# Patient Record
Sex: FEMALE | Race: WHITE
[De-identification: names, ages, dates, MRNs, and addresses within clinical notes are randomized per-mention and may not be internally consistent; named-entity substitution may affect disease eponyms.]

---

## 2022-08-30 ENCOUNTER — HOSPITAL ENCOUNTER (OUTPATIENT)
Dept: HOSPITAL 49 - FAS | Age: 82
Discharge: HOME | End: 2022-08-30
Attending: ORTHOPAEDIC SURGERY
Payer: MEDICARE

## 2022-08-30 VITALS — BODY MASS INDEX: 19.16 KG/M2 | WEIGHT: 114.99 LBS | HEIGHT: 65 IN

## 2022-08-30 DIAGNOSIS — E78.5: ICD-10-CM

## 2022-08-30 DIAGNOSIS — M16.11: Primary | ICD-10-CM

## 2022-08-30 DIAGNOSIS — J45.909: ICD-10-CM

## 2022-08-30 DIAGNOSIS — Z79.899: ICD-10-CM

## 2022-08-30 DIAGNOSIS — Z79.891: ICD-10-CM

## 2022-08-30 DIAGNOSIS — I10: ICD-10-CM

## 2022-08-30 DIAGNOSIS — K21.9: ICD-10-CM

## 2022-10-11 ENCOUNTER — PREP FOR SURGERY (OUTPATIENT)
Dept: OTHER | Facility: HOSPITAL | Age: 82
End: 2022-10-11

## 2022-10-11 DIAGNOSIS — M16.11 PRIMARY OSTEOARTHRITIS OF RIGHT HIP: Primary | ICD-10-CM

## 2022-10-11 RX ORDER — ACETAMINOPHEN 500 MG
1000 TABLET ORAL ONCE
Status: CANCELLED | OUTPATIENT
Start: 2022-10-28 | End: 2022-10-11

## 2022-10-11 RX ORDER — PREGABALIN 75 MG/1
75 CAPSULE ORAL ONCE
Status: CANCELLED | OUTPATIENT
Start: 2022-10-28 | End: 2022-10-11

## 2022-10-11 RX ORDER — TRANEXAMIC ACID 10 MG/ML
1000 INJECTION, SOLUTION INTRAVENOUS ONCE
Status: CANCELLED | OUTPATIENT
Start: 2022-10-28 | End: 2022-10-11

## 2022-10-11 RX ORDER — CHLORHEXIDINE GLUCONATE 500 MG/1
CLOTH TOPICAL ONCE
Status: CANCELLED | OUTPATIENT
Start: 2022-10-28

## 2022-10-11 RX ORDER — CHLORHEXIDINE GLUCONATE 500 MG/1
CLOTH TOPICAL 2 TIMES DAILY
Status: CANCELLED | OUTPATIENT
Start: 2022-10-11

## 2022-10-11 RX ORDER — CEFAZOLIN SODIUM 2 G/100ML
2 INJECTION, SOLUTION INTRAVENOUS ONCE
Status: CANCELLED | OUTPATIENT
Start: 2022-10-28 | End: 2022-10-11

## 2022-10-11 NOTE — H&P
Chief Complaint  Right hip pain  Patient's Pharmacies  [id number] ([facility]): [address] [place] [address], Ph [phone], Fax [phone]  Vitals  10/11/2022 09:59 am  Ht: 5 ft 5 in  Wt: 110 lbs  BMI: 18.3  Body Surface Area: 1.51 m²  Allergies  Reviewed Allergies  NKDA  Medications  Reviewed Medications  dilTIAZem  mg capsule,extended release 24 hr  TAKE 1 CAPSULE BY MOUTH EVERY DAY  08/23/22   filled surescripts  levothyroxine 50 mcg tablet  PLEASE SEE ATTACHED FOR DETAILED DIRECTIONS  09/30/22   filled surescripts  lisinopriL 10 mg tablet  TAKE 1 TABLET BY MOUTH EVERY DAY  09/18/22   filled surescripts  methylPREDNISolone 4 mg tablets in a dose pack  TAKE 6 TABLETS ON DAY 1 AS DIRECTED ON PACKAGE AND DECREASE BY 1 TAB EACH DAY FOR A TOTAL OF 6 DAYS  06/21/22   filled surescripts  miSOPROStoL 200 mcg tablet  Take 1 tablet(s) 4 times a day by oral route.  10/11/22   entered Kiara Torres  montelukast 10 mg tablet  TAKE 1 TABLET BY MOUTH EVERY DAY  08/19/22   filled surescripts  rosuvastatin 5 mg tablet  TAKE 1 TABLET BY MOUTH EVERY DAY FOR 90 DAYS  09/06/22   filled surescripts  simvastatin 80 mg tablet  TAKE 1 TABLET BY MOUTH EVERY DAY IN THE EVENING  05/31/22   filled surescripts  Vitamin B-6 100 mg tablet  TAKE 1 TABLET BY MOUTH EVERY DAY FOR 90 DAYS  07/19/22   filled surescripts  Vaccines  Reviewed Vaccines  Vaccine Type Date Amt. Route Site NDC Lot # Mfr. Exp.  Date VIS VIS  Given Vaccinator  Influenza  influenza, injectable, quadrivalent 11/01/21            Problems  Reviewed Problems  Family History  Reviewed Family History  Social History  Reviewed Social History  Substance Use  Do you or have you ever smoked tobacco?: Never smoker  Do you or have you ever used any other forms of tobacco or nicotine?: No  Has tobacco cessation counseling been provided?: No  What is your level of alcohol consumption?: None  Do you use any illicit or recreational drugs?: No  Public Health  and Travel  Have you recently traveled abroad?: No  Have you been to an area known to be high risk for COVID-19?: No  In the 14 days before symptom onset, have you had close contact with a laboratory-confirmed COVID-19 while that case was ill?: No  In the 14 days before symptom onset, have you had close contact with a person who is under investigation for COVID-19 while that person was ill?: No  Advanced Directive  Do you have an advanced directive?: No  Do you have a medical power of ?: No  Surgical History  Reviewed Surgical History  Hysterectomy - 01/01/2012  Past Medical History  Reviewed Past Medical History  Gastrointestinal Disease/GERD: Y  High Cholesterol: Y  Hypertension: Y  Thyroid Disease: Y  HPI  Conchita is a 82-year-old female who comes into the office today for evaluation and management of her right hip pain.  She has been dealing with this pain for several years. She has mostly pain in the right groin area at times it radiates to the right thigh and knee.  She was recently seeing her primary care physician and was advised to see her orthopedic surgeon Dr. Irizarry. Dr. Irizarry has evaluated her and has managed her arthritis initially with an intra-articular cortisone injection. Subsequent follow-up showed that there was significant collapse of the right femoral head. She has been referred for a right total hip replacement.  Patient states that her pain has been worsening recently and she has difficulty ambulating any distances. She has been using a cane for the past 3 to 4 weeks. She is here in the office with a walker today.  She states that she has difficulty putting her shoes and socks on difficulty getting in and out of her car.  Denies any history of MRSA, DVT, cardiac problems.  She is accompanied by her daughter-in-law to the office visit.  ROS  ROS as noted in the HPI  Physical Exam  GAIT antalgic, with walker  Right hip  Normal: Neurovascular status is intact. Sensation in hip is  normal. DP Pulse is 3+. PT PULSE is 3+. Capillary Refill is normal. Reflexes are normal.  ROM  Internal Rotation: <30  External Rotation: <40  Abduction: <45  Adduction: <15  Flexion: <100  Extension: <5    Tenderness  Location: groin  Radiation of Pain: anterior thigh  Pain w/ Palpation: none  Katerina Test: negative  Impingement: negative  Straight Leg Raise: negative    Strength  Quad: normal  Abduction: normal  Adduction: normal  Flexion: normal  Extension: normal  Positive Stinchfield sign.  Positive Trendelenburg sign.  Attempted movements of the hip are painful and restricted  Assessment / Plan  1. Osteoarthritis of right hip joint  M16.11: Unilateral primary osteoarthritis, right hip  MEDICAL CLEARANCE* -     Note to Provider: Scheduled for [R] Total hip replacement October 28, 2022 at [facility]. Please provide medical clearance.    2. Pain in right hip joint  M25.551: Pain in right hip  XR, HIP + PELVIS, UNILATERAL, 2 OR 3 VIEW  Side: RIGHT    XR, HIP + PELVIS, UNILATERAL, 2 OR 3 VIEW  Side: RIGHT  Result:  - XRAY 1 HIP + PELVIS 2-3 VIEW: Performed  Result Note: Complete loss of right hip joint space with collapsed femoral head superior femoral head wear, superior acetabular wear, osteophyte formation of the femoral head.  Discussion Notes  Options and alternatives were discussed in detail with the patient.  The patient has reached the point of disability and failed nonoperative management.  The patient is indicated for a right total hip replacement . She is only about 2 months s/p her injection at the time of the scheduled surgery. But I think she will need this to be done as there is progressively worsening collapse of the femoral head and this is going to make the surgery more difficult. The risks of difficult surgery outweigh the risks of possible infection from the steroid injection.  The likely Risks and benefits of the procedure including but not limited to infection, DVT, pulmonary embolism,  recurrent dislocation, Leg length discrepancy, periprosthetic fractures, possibility of injury to nerves or vessels, tendons, possibility of morbidity and mortality likely medical risks for stroke and heart attack, have been discussed in detail.  Despite the risks involved the patient would like to proceed. The patient is being scheduled for a right total HIP arthroplasty by DIRECT ANTERIOR APPROACH at [facility] on October 28,2022.  I will request for Medical and cardiac clearance from Dr. Silverio MD .  Postoperative DVT prophylaxis - Patient has no high risk factors Plan for ASPIRIN  Preoperative antibiotic prophylaxis - Plan for SCIP protocol with CEFAZOLIN weight based.  She will be admitted for 23-hour observation.

## 2022-10-27 RX ORDER — MONTELUKAST SODIUM 10 MG/1
10 TABLET ORAL NIGHTLY
COMMUNITY

## 2022-10-27 RX ORDER — MELATONIN
1000 DAILY
COMMUNITY

## 2022-10-27 RX ORDER — DICLOFENAC SODIUM 75 MG/1
75 TABLET, DELAYED RELEASE ORAL 2 TIMES DAILY
COMMUNITY

## 2022-10-27 RX ORDER — DILTIAZEM HYDROCHLORIDE 120 MG/1
120 CAPSULE, COATED, EXTENDED RELEASE ORAL DAILY
COMMUNITY

## 2022-10-27 RX ORDER — FAMOTIDINE 20 MG/1
20 TABLET, FILM COATED ORAL 2 TIMES DAILY
COMMUNITY

## 2022-10-27 RX ORDER — SIMVASTATIN 5 MG
5 TABLET ORAL NIGHTLY
COMMUNITY

## 2022-10-27 RX ORDER — LEVOTHYROXINE SODIUM 0.03 MG/1
25 TABLET ORAL DAILY
COMMUNITY

## 2022-10-27 RX ORDER — ACETAMINOPHEN 500 MG
1000 TABLET ORAL EVERY 6 HOURS PRN
COMMUNITY

## 2022-10-27 RX ORDER — MISOPROSTOL 100 MCG
12.5 TABLET ORAL ONCE
COMMUNITY

## 2022-10-27 RX ORDER — LISINOPRIL 5 MG/1
5 TABLET ORAL DAILY
COMMUNITY

## 2022-10-27 RX ORDER — PHENOL 1.4 %
600 AEROSOL, SPRAY (ML) MUCOUS MEMBRANE DAILY
COMMUNITY

## 2022-10-27 RX ORDER — METOPROLOL SUCCINATE 25 MG/1
12.5 TABLET, EXTENDED RELEASE ORAL DAILY
COMMUNITY

## 2022-10-28 ENCOUNTER — ANESTHESIA EVENT (OUTPATIENT)
Dept: PERIOP | Facility: HOSPITAL | Age: 82
End: 2022-10-28

## 2022-10-28 ENCOUNTER — ANESTHESIA (OUTPATIENT)
Dept: PERIOP | Facility: HOSPITAL | Age: 82
End: 2022-10-28

## 2022-10-28 ENCOUNTER — APPOINTMENT (OUTPATIENT)
Dept: GENERAL RADIOLOGY | Facility: HOSPITAL | Age: 82
End: 2022-10-28

## 2022-10-28 ENCOUNTER — HOSPITAL ENCOUNTER (OUTPATIENT)
Facility: HOSPITAL | Age: 82
Discharge: HOME HEALTH | End: 2022-10-28
Attending: ORTHOPAEDIC SURGERY | Admitting: ORTHOPAEDIC SURGERY

## 2022-10-28 VITALS
WEIGHT: 108.91 LBS | BODY MASS INDEX: 18.15 KG/M2 | HEART RATE: 61 BPM | DIASTOLIC BLOOD PRESSURE: 62 MMHG | RESPIRATION RATE: 16 BRPM | SYSTOLIC BLOOD PRESSURE: 154 MMHG | OXYGEN SATURATION: 100 % | TEMPERATURE: 97.8 F | HEIGHT: 65 IN

## 2022-10-28 DIAGNOSIS — M16.11 PRIMARY OSTEOARTHRITIS OF RIGHT HIP: ICD-10-CM

## 2022-10-28 DIAGNOSIS — Z96.641 STATUS POST TOTAL HIP REPLACEMENT, RIGHT: Primary | ICD-10-CM

## 2022-10-28 PROCEDURE — 76000 FLUOROSCOPY <1 HR PHYS/QHP: CPT

## 2022-10-28 PROCEDURE — 25010000002 ROPIVACAINE PER 1 MG: Performed by: ORTHOPAEDIC SURGERY

## 2022-10-28 PROCEDURE — 97116 GAIT TRAINING THERAPY: CPT

## 2022-10-28 PROCEDURE — 25010000002 FENTANYL CITRATE (PF) 100 MCG/2ML SOLUTION: Performed by: NURSE ANESTHETIST, CERTIFIED REGISTERED

## 2022-10-28 PROCEDURE — A9270 NON-COVERED ITEM OR SERVICE: HCPCS | Performed by: ORTHOPAEDIC SURGERY

## 2022-10-28 PROCEDURE — G0378 HOSPITAL OBSERVATION PER HR: HCPCS

## 2022-10-28 PROCEDURE — 73501 X-RAY EXAM HIP UNI 1 VIEW: CPT

## 2022-10-28 PROCEDURE — 97162 PT EVAL MOD COMPLEX 30 MIN: CPT

## 2022-10-28 PROCEDURE — 25010000002 PROPOFOL 10 MG/ML EMULSION: Performed by: NURSE ANESTHETIST, CERTIFIED REGISTERED

## 2022-10-28 PROCEDURE — 25010000002 CEFAZOLIN IN DEXTROSE 2-4 GM/100ML-% SOLUTION: Performed by: ORTHOPAEDIC SURGERY

## 2022-10-28 PROCEDURE — 25010000002 ONDANSETRON PER 1 MG: Performed by: NURSE ANESTHETIST, CERTIFIED REGISTERED

## 2022-10-28 PROCEDURE — 25010000002 DEXAMETHASONE SODIUM PHOSPHATE 20 MG/5ML SOLUTION: Performed by: NURSE ANESTHETIST, CERTIFIED REGISTERED

## 2022-10-28 PROCEDURE — 25010000002 CLONIDINE PER 1 MG: Performed by: ORTHOPAEDIC SURGERY

## 2022-10-28 PROCEDURE — 97110 THERAPEUTIC EXERCISES: CPT

## 2022-10-28 PROCEDURE — 63710000001 ACETAMINOPHEN 500 MG TABLET: Performed by: ORTHOPAEDIC SURGERY

## 2022-10-28 PROCEDURE — C1776 JOINT DEVICE (IMPLANTABLE): HCPCS | Performed by: ORTHOPAEDIC SURGERY

## 2022-10-28 PROCEDURE — 63710000001 PREGABALIN 75 MG CAPSULE: Performed by: ORTHOPAEDIC SURGERY

## 2022-10-28 PROCEDURE — 25010000002 KETOROLAC TROMETHAMINE PER 15 MG: Performed by: ORTHOPAEDIC SURGERY

## 2022-10-28 DEVICE — DEV CONTRL TISS STRATAFIX SPIRAL PDS PLS CT1 2-0 45CM: Type: IMPLANTABLE DEVICE | Site: HIP | Status: FUNCTIONAL

## 2022-10-28 DEVICE — HEMO ABS GELFOAM SPNG PORCN SZ100: Type: IMPLANTABLE DEVICE | Site: HIP | Status: FUNCTIONAL

## 2022-10-28 DEVICE — PINNACLE POROCOAT ACETABULAR SHELL SECTOR II 50MM OD
Type: IMPLANTABLE DEVICE | Site: HIP | Status: FUNCTIONAL
Brand: PINNACLE POROCOAT

## 2022-10-28 DEVICE — TOTL HIP COP DEPUY 9641334: Type: IMPLANTABLE DEVICE | Site: HIP | Status: FUNCTIONAL

## 2022-10-28 DEVICE — BIOLOX DELTA CERAMIC FEMORAL HEAD 32MM DIA +5.0 12/14 TAPER
Type: IMPLANTABLE DEVICE | Site: HIP | Status: FUNCTIONAL
Brand: BIOLOX DELTA

## 2022-10-28 DEVICE — ACTIS DUOFIX HIP PROSTHESIS (FEMORAL STEM 12/14 TAPER CEMENTLESS SIZE 4 STD COLLAR)  CE
Type: IMPLANTABLE DEVICE | Site: HIP | Status: FUNCTIONAL
Brand: ACTIS

## 2022-10-28 DEVICE — SUT FW #2 W/TPR NDL 1/2 CIR 38IN 97CM 26.5MM BLU: Type: IMPLANTABLE DEVICE | Site: HIP | Status: FUNCTIONAL

## 2022-10-28 DEVICE — PINNACLE HIP SOLUTIONS ALTRX POLYETHYLENE ACETABULAR LINER NEUTRAL 32MM ID 50MM OD
Type: IMPLANTABLE DEVICE | Site: HIP | Status: FUNCTIONAL
Brand: PINNACLE ALTRX

## 2022-10-28 RX ORDER — TRANEXAMIC ACID 100 MG/ML
INJECTION, SOLUTION INTRAVENOUS AS NEEDED
Status: DISCONTINUED | OUTPATIENT
Start: 2022-10-28 | End: 2022-10-28 | Stop reason: SURG

## 2022-10-28 RX ORDER — DOCUSATE SODIUM 100 MG/1
100 CAPSULE, LIQUID FILLED ORAL 2 TIMES DAILY
Qty: 30 CAPSULE | Refills: 0 | Status: SHIPPED | OUTPATIENT
Start: 2022-10-28 | End: 2022-11-12

## 2022-10-28 RX ORDER — HYDROCODONE BITARTRATE AND ACETAMINOPHEN 5; 325 MG/1; MG/1
1 TABLET ORAL EVERY 4 HOURS PRN
Status: DISCONTINUED | OUTPATIENT
Start: 2022-10-28 | End: 2022-10-28 | Stop reason: HOSPADM

## 2022-10-28 RX ORDER — CEFAZOLIN SODIUM 2 G/100ML
2 INJECTION, SOLUTION INTRAVENOUS EVERY 8 HOURS
Status: DISCONTINUED | OUTPATIENT
Start: 2022-10-28 | End: 2022-10-28 | Stop reason: HOSPADM

## 2022-10-28 RX ORDER — ONDANSETRON 2 MG/ML
4 INJECTION INTRAMUSCULAR; INTRAVENOUS ONCE AS NEEDED
Status: CANCELLED | OUTPATIENT
Start: 2022-10-28

## 2022-10-28 RX ORDER — DEXAMETHASONE SODIUM PHOSPHATE 4 MG/ML
INJECTION, SOLUTION INTRA-ARTICULAR; INTRALESIONAL; INTRAMUSCULAR; INTRAVENOUS; SOFT TISSUE AS NEEDED
Status: DISCONTINUED | OUTPATIENT
Start: 2022-10-28 | End: 2022-10-28 | Stop reason: SURG

## 2022-10-28 RX ORDER — SODIUM CHLORIDE 0.9 % (FLUSH) 0.9 %
3 SYRINGE (ML) INJECTION EVERY 12 HOURS SCHEDULED
Status: DISCONTINUED | OUTPATIENT
Start: 2022-10-28 | End: 2022-10-28 | Stop reason: HOSPADM

## 2022-10-28 RX ORDER — FLUMAZENIL 0.1 MG/ML
0.2 INJECTION INTRAVENOUS AS NEEDED
Status: DISCONTINUED | OUTPATIENT
Start: 2022-10-28 | End: 2022-10-28 | Stop reason: HOSPADM

## 2022-10-28 RX ORDER — GLYCOPYRROLATE 0.2 MG/ML
INJECTION INTRAMUSCULAR; INTRAVENOUS AS NEEDED
Status: DISCONTINUED | OUTPATIENT
Start: 2022-10-28 | End: 2022-10-28 | Stop reason: SURG

## 2022-10-28 RX ORDER — MELOXICAM 15 MG/1
7.5 TABLET ORAL ONCE
Status: CANCELLED | OUTPATIENT
Start: 2022-10-28 | End: 2022-10-28

## 2022-10-28 RX ORDER — CHLORHEXIDINE GLUCONATE 500 MG/1
CLOTH TOPICAL ONCE
Status: DISCONTINUED | OUTPATIENT
Start: 2022-10-28 | End: 2022-10-28 | Stop reason: HOSPADM

## 2022-10-28 RX ORDER — CEFAZOLIN SODIUM 2 G/100ML
2 INJECTION, SOLUTION INTRAVENOUS ONCE
Status: COMPLETED | OUTPATIENT
Start: 2022-10-28 | End: 2022-10-28

## 2022-10-28 RX ORDER — HYDRALAZINE HYDROCHLORIDE 20 MG/ML
5 INJECTION INTRAMUSCULAR; INTRAVENOUS
Status: DISCONTINUED | OUTPATIENT
Start: 2022-10-28 | End: 2022-10-28 | Stop reason: HOSPADM

## 2022-10-28 RX ORDER — HYDROCODONE BITARTRATE AND ACETAMINOPHEN 5; 325 MG/1; MG/1
1 TABLET ORAL ONCE AS NEEDED
Status: CANCELLED | OUTPATIENT
Start: 2022-10-28 | End: 2022-11-04

## 2022-10-28 RX ORDER — ASPIRIN 81 MG/1
81 TABLET ORAL DAILY
Qty: 60 TABLET | Refills: 0 | Status: SHIPPED | OUTPATIENT
Start: 2022-10-28 | End: 2022-11-27

## 2022-10-28 RX ORDER — NALOXONE HCL 0.4 MG/ML
0.2 VIAL (ML) INJECTION AS NEEDED
Status: DISCONTINUED | OUTPATIENT
Start: 2022-10-28 | End: 2022-10-28 | Stop reason: HOSPADM

## 2022-10-28 RX ORDER — HYDROCODONE BITARTRATE AND ACETAMINOPHEN 7.5; 325 MG/1; MG/1
1 TABLET ORAL ONCE AS NEEDED
Status: DISCONTINUED | OUTPATIENT
Start: 2022-10-28 | End: 2022-10-28 | Stop reason: HOSPADM

## 2022-10-28 RX ORDER — ONDANSETRON 4 MG/1
4 TABLET, FILM COATED ORAL ONCE AS NEEDED
Status: CANCELLED | OUTPATIENT
Start: 2022-10-28

## 2022-10-28 RX ORDER — ONDANSETRON 2 MG/ML
4 INJECTION INTRAMUSCULAR; INTRAVENOUS ONCE AS NEEDED
Status: DISCONTINUED | OUTPATIENT
Start: 2022-10-28 | End: 2022-10-28 | Stop reason: HOSPADM

## 2022-10-28 RX ORDER — LIDOCAINE HYDROCHLORIDE 20 MG/ML
INJECTION, SOLUTION INFILTRATION; PERINEURAL AS NEEDED
Status: DISCONTINUED | OUTPATIENT
Start: 2022-10-28 | End: 2022-10-28 | Stop reason: SURG

## 2022-10-28 RX ORDER — ACETAMINOPHEN 500 MG
1000 TABLET ORAL ONCE
Status: CANCELLED | OUTPATIENT
Start: 2022-10-28 | End: 2022-10-28

## 2022-10-28 RX ORDER — HYDROCODONE BITARTRATE AND ACETAMINOPHEN 7.5; 325 MG/1; MG/1
1 TABLET ORAL EVERY 4 HOURS PRN
Qty: 42 TABLET | Refills: 0 | Status: SHIPPED | OUTPATIENT
Start: 2022-10-28 | End: 2022-11-04

## 2022-10-28 RX ORDER — MAGNESIUM HYDROXIDE 1200 MG/15ML
LIQUID ORAL AS NEEDED
Status: DISCONTINUED | OUTPATIENT
Start: 2022-10-28 | End: 2022-10-28 | Stop reason: HOSPADM

## 2022-10-28 RX ORDER — ACETAMINOPHEN 500 MG
1000 TABLET ORAL ONCE
Status: COMPLETED | OUTPATIENT
Start: 2022-10-28 | End: 2022-10-28

## 2022-10-28 RX ORDER — LABETALOL HYDROCHLORIDE 5 MG/ML
5 INJECTION, SOLUTION INTRAVENOUS
Status: DISCONTINUED | OUTPATIENT
Start: 2022-10-28 | End: 2022-10-28 | Stop reason: HOSPADM

## 2022-10-28 RX ORDER — ASPIRIN 81 MG/1
81 TABLET ORAL 2 TIMES DAILY
Status: CANCELLED | OUTPATIENT
Start: 2022-10-28 | End: 2022-11-28

## 2022-10-28 RX ORDER — HYDROMORPHONE HYDROCHLORIDE 1 MG/ML
0.5 INJECTION, SOLUTION INTRAMUSCULAR; INTRAVENOUS; SUBCUTANEOUS
Status: DISCONTINUED | OUTPATIENT
Start: 2022-10-28 | End: 2022-10-28 | Stop reason: HOSPADM

## 2022-10-28 RX ORDER — ONDANSETRON 4 MG/1
4 TABLET, FILM COATED ORAL EVERY 8 HOURS PRN
Qty: 30 TABLET | Refills: 0 | Status: SHIPPED | OUTPATIENT
Start: 2022-10-28

## 2022-10-28 RX ORDER — SODIUM CHLORIDE 0.9 % (FLUSH) 0.9 %
3-10 SYRINGE (ML) INJECTION AS NEEDED
Status: DISCONTINUED | OUTPATIENT
Start: 2022-10-28 | End: 2022-10-28 | Stop reason: HOSPADM

## 2022-10-28 RX ORDER — LIDOCAINE HYDROCHLORIDE 10 MG/ML
0.5 INJECTION, SOLUTION EPIDURAL; INFILTRATION; INTRACAUDAL; PERINEURAL ONCE AS NEEDED
Status: DISCONTINUED | OUTPATIENT
Start: 2022-10-28 | End: 2022-10-28 | Stop reason: HOSPADM

## 2022-10-28 RX ORDER — HYDROCODONE BITARTRATE AND ACETAMINOPHEN 5; 325 MG/1; MG/1
2 TABLET ORAL EVERY 4 HOURS PRN
Status: DISCONTINUED | OUTPATIENT
Start: 2022-10-28 | End: 2022-10-28 | Stop reason: HOSPADM

## 2022-10-28 RX ORDER — OXYCODONE AND ACETAMINOPHEN 7.5; 325 MG/1; MG/1
1 TABLET ORAL EVERY 4 HOURS PRN
Status: DISCONTINUED | OUTPATIENT
Start: 2022-10-28 | End: 2022-10-28 | Stop reason: HOSPADM

## 2022-10-28 RX ORDER — PREGABALIN 75 MG/1
75 CAPSULE ORAL ONCE
Status: COMPLETED | OUTPATIENT
Start: 2022-10-28 | End: 2022-10-28

## 2022-10-28 RX ORDER — EPHEDRINE SULFATE 50 MG/ML
5 INJECTION, SOLUTION INTRAVENOUS ONCE AS NEEDED
Status: DISCONTINUED | OUTPATIENT
Start: 2022-10-28 | End: 2022-10-28 | Stop reason: HOSPADM

## 2022-10-28 RX ORDER — PROPOFOL 10 MG/ML
VIAL (ML) INTRAVENOUS AS NEEDED
Status: DISCONTINUED | OUTPATIENT
Start: 2022-10-28 | End: 2022-10-28 | Stop reason: SURG

## 2022-10-28 RX ORDER — FENTANYL CITRATE 50 UG/ML
50 INJECTION, SOLUTION INTRAMUSCULAR; INTRAVENOUS
Status: DISCONTINUED | OUTPATIENT
Start: 2022-10-28 | End: 2022-10-28 | Stop reason: HOSPADM

## 2022-10-28 RX ORDER — SODIUM CHLORIDE, SODIUM LACTATE, POTASSIUM CHLORIDE, CALCIUM CHLORIDE 600; 310; 30; 20 MG/100ML; MG/100ML; MG/100ML; MG/100ML
9 INJECTION, SOLUTION INTRAVENOUS CONTINUOUS
Status: DISCONTINUED | OUTPATIENT
Start: 2022-10-28 | End: 2022-10-28 | Stop reason: HOSPADM

## 2022-10-28 RX ORDER — ONDANSETRON 2 MG/ML
INJECTION INTRAMUSCULAR; INTRAVENOUS AS NEEDED
Status: DISCONTINUED | OUTPATIENT
Start: 2022-10-28 | End: 2022-10-28 | Stop reason: SURG

## 2022-10-28 RX ORDER — ASPIRIN 81 MG/1
81 TABLET ORAL DAILY
Status: DISCONTINUED | OUTPATIENT
Start: 2022-10-28 | End: 2022-10-28 | Stop reason: HOSPADM

## 2022-10-28 RX ORDER — TRANEXAMIC ACID 10 MG/ML
1000 INJECTION, SOLUTION INTRAVENOUS ONCE
Status: DISCONTINUED | OUTPATIENT
Start: 2022-10-28 | End: 2022-10-28 | Stop reason: HOSPADM

## 2022-10-28 RX ORDER — POVIDONE-IODINE 10 MG/ML
SOLUTION TOPICAL ONCE
Status: COMPLETED | OUTPATIENT
Start: 2022-10-28 | End: 2022-10-28

## 2022-10-28 RX ORDER — BUPIVACAINE HYDROCHLORIDE 5 MG/ML
INJECTION, SOLUTION EPIDURAL; INTRACAUDAL
Status: COMPLETED | OUTPATIENT
Start: 2022-10-28 | End: 2022-10-28

## 2022-10-28 RX ORDER — FENTANYL CITRATE 50 UG/ML
INJECTION, SOLUTION INTRAMUSCULAR; INTRAVENOUS AS NEEDED
Status: DISCONTINUED | OUTPATIENT
Start: 2022-10-28 | End: 2022-10-28 | Stop reason: SURG

## 2022-10-28 RX ORDER — DOCUSATE SODIUM 100 MG/1
100 CAPSULE, LIQUID FILLED ORAL ONCE
Status: CANCELLED | OUTPATIENT
Start: 2022-10-28 | End: 2022-10-28

## 2022-10-28 RX ADMIN — LIDOCAINE HYDROCHLORIDE 60 MG: 20 INJECTION, SOLUTION INFILTRATION; PERINEURAL at 08:35

## 2022-10-28 RX ADMIN — POVIDONE-IODINE: 10 SOLUTION TOPICAL at 06:55

## 2022-10-28 RX ADMIN — CEFAZOLIN SODIUM 2 G: 2 INJECTION, SOLUTION INTRAVENOUS at 08:16

## 2022-10-28 RX ADMIN — BUPIVACAINE HYDROCHLORIDE 1.75 ML: 5 INJECTION, SOLUTION EPIDURAL; INTRACAUDAL; PERINEURAL at 08:30

## 2022-10-28 RX ADMIN — GLYCOPYRROLATE 0.2 MG: 1 INJECTION INTRAMUSCULAR; INTRAVENOUS at 08:51

## 2022-10-28 RX ADMIN — TRANEXAMIC ACID 1000 MG: 1 INJECTION, SOLUTION INTRAVENOUS at 08:47

## 2022-10-28 RX ADMIN — PROPOFOL 25 MCG/KG/MIN: 10 INJECTION, EMULSION INTRAVENOUS at 08:35

## 2022-10-28 RX ADMIN — FENTANYL CITRATE 50 MCG: 50 INJECTION, SOLUTION INTRAMUSCULAR; INTRAVENOUS at 09:04

## 2022-10-28 RX ADMIN — FENTANYL CITRATE 25 MCG: 50 INJECTION, SOLUTION INTRAMUSCULAR; INTRAVENOUS at 10:16

## 2022-10-28 RX ADMIN — PROPOFOL 30 MG: 10 INJECTION, EMULSION INTRAVENOUS at 08:35

## 2022-10-28 RX ADMIN — PROPOFOL 30 MG: 10 INJECTION, EMULSION INTRAVENOUS at 08:41

## 2022-10-28 RX ADMIN — ACETAMINOPHEN 1000 MG: 500 TABLET ORAL at 07:08

## 2022-10-28 RX ADMIN — SODIUM CHLORIDE, POTASSIUM CHLORIDE, SODIUM LACTATE AND CALCIUM CHLORIDE 9 ML/HR: 600; 310; 30; 20 INJECTION, SOLUTION INTRAVENOUS at 07:41

## 2022-10-28 RX ADMIN — DEXAMETHASONE SODIUM PHOSPHATE 8 MG: 4 INJECTION, SOLUTION INTRAMUSCULAR; INTRAVENOUS at 08:49

## 2022-10-28 RX ADMIN — SODIUM CHLORIDE, POTASSIUM CHLORIDE, SODIUM LACTATE AND CALCIUM CHLORIDE 9 ML/HR: 600; 310; 30; 20 INJECTION, SOLUTION INTRAVENOUS at 11:46

## 2022-10-28 RX ADMIN — ONDANSETRON 4 MG: 2 INJECTION INTRAMUSCULAR; INTRAVENOUS at 10:16

## 2022-10-28 RX ADMIN — FENTANYL CITRATE 25 MCG: 50 INJECTION, SOLUTION INTRAMUSCULAR; INTRAVENOUS at 10:25

## 2022-10-28 RX ADMIN — PREGABALIN 75 MG: 75 CAPSULE ORAL at 07:08

## 2022-10-28 NOTE — ANESTHESIA PREPROCEDURE EVALUATION
Anesthesia Evaluation     Patient summary reviewed and Nursing notes reviewed   no history of anesthetic complications:  NPO Solid Status: > 8 hours  NPO Liquid Status: > 2 hours           Airway   Mallampati: II  TM distance: >3 FB  Neck ROM: full  Dental      Pulmonary    (+) asthma,  Cardiovascular     (+) hypertension,       Neuro/Psych  GI/Hepatic/Renal/Endo    (+)   thyroid problem hypothyroidism    Musculoskeletal     Abdominal    Substance History      OB/GYN          Other                        Anesthesia Plan    ASA 2     spinal     intravenous induction     Anesthetic plan, risks, benefits, and alternatives have been provided, discussed and informed consent has been obtained with: patient.        CODE STATUS:

## 2022-10-28 NOTE — ANESTHESIA PROCEDURE NOTES
Spinal Block      Patient location during procedure: OR  Start Time: 10/28/2022 8:25 AM  Stop Time: 10/28/2022 8:30 AM  Indication:at surgeon's request  Performed By  Anesthesiologist: Adalberot Hood MD  Preanesthetic Checklist  Completed: patient identified, IV checked, site marked, risks and benefits discussed, surgical consent, monitors and equipment checked, pre-op evaluation and timeout performed  Spinal Block Prep:  Patient Position:sitting  Sterile Tech:cap, gloves, sterile barriers and mask  Prep:Chloraprep  Patient Monitoring:EKG, continuous pulse oximetry and blood pressure monitoring    Spinal Block Procedure  Approach:right paramedian  Guidance:landmark technique and palpation technique  Location:L4-L5  Needle Type:Cece  Needle Gauge:25 G  Placement of Spinal needle event:cerebrospinal fluid aspirated  Paresthesia: no  Fluid Appearance:clear  Medications: bupivacaine (PF) (MARCAINE) 0.5 % injection - Spinal   1.75 mL - 10/28/2022 8:30:00 AM   Post Assessment  Patient Tolerance:patient tolerated the procedure well with no apparent complications  Complications no

## 2022-10-28 NOTE — ADDENDUM NOTE
Addendum  created 10/28/22 1637 by Adalberto Hood MD    Attestation recorded in Intraprocedure, Intraprocedure Attestations filed

## 2022-10-28 NOTE — ANESTHESIA POSTPROCEDURE EVALUATION
Patient: Conchita Huff    Procedure Summary     Date: 10/28/22 Room / Location: [facility] OR [address] [place] [address] [facility]    Anesthesia Start: 0821 Anesthesia Stop: 1039    Procedure: TOTAL HIP ARTHROPLASTY ANTERIOR WITH HANA TABLE (Right: Hip) Diagnosis:       Primary osteoarthritis of right hip      (Primary osteoarthritis of right hip [M16.11])    Surgeons: Onel Irvin MD Provider: Adalberto Hood MD    Anesthesia Type: spinal ASA Status: 2          Anesthesia Type: spinal    Vitals  Vitals Value Taken Time   /61 10/28/22 1245   Temp 36.6 °C (97.8 °F) 10/28/22 1035   Pulse 60 10/28/22 1257   Resp 16 10/28/22 1215   SpO2 100 % 10/28/22 1257   Vitals shown include unvalidated device data.        Post Anesthesia Care and Evaluation    Patient location during evaluation: bedside  Patient participation: complete - patient participated  Level of consciousness: awake and alert  Pain management: adequate    Airway patency: patent  Anesthetic complications: No anesthetic complications  PONV Status: controlled  Cardiovascular status: blood pressure returned to baseline and acceptable  Respiratory status: acceptable  Hydration status: acceptable

## 2022-10-31 ENCOUNTER — TELEPHONE (OUTPATIENT)
Dept: ORTHOPEDIC SURGERY | Facility: HOSPITAL | Age: 82
End: 2022-10-31

## 2022-10-31 NOTE — TELEPHONE ENCOUNTER
Post op day 3  Discharge Instructions:  Ask patient about his or her discharge instructions  ?  Patient confirmed understanding   ?  Further instruction needed   What, if any, recommendations, teaching, or interventions did you provide? Click or tap here to enter text.  Health status:  Pain controlled Yes   She isn’t having much pain she is taking the medication as needed and it is helping.   Recommended interventions:  Yes  incision/dressing status   ?  Clean without redness, drainage, odor  ?  Redness    ?  Drainage - color Click or tap here to enter text.  ?  Odor  ALISON - Green light blinking Yes  Difficulties urination No  Last BM 10/28/2022 (if no BM by day 3-recommend OTC suppository or fleets enema)  Taking the medications decreased appetite; N/V  Medications:  ?Medications reviewed with patient/family/caregiver  Patient taking medications as prescribed?   Yes  If not taking medications as prescribed, note specific medicine(s) and reason for each:  Click or tap here to enter text.  Hospital Follow Up Plan:  Follow up Appointment with Orthopedic surgeon:  ?Has f/u appointment                ?Scheduled f/u appointment  Home Care ordered at discharge?    No        Home Care started, or contact made?    No   If no, action taken: OP PT    DME obtained/used in home?         Yes   Using IS  Choose an item.   Other information:   Ms. Huff went home same day on Friday. She said she is doing ok. She had to go to the ER Sunday because she was having a lot of Nausea and vomiting. She was given some fluids, diagnosed with a UTI and was able to come home early this morning. She said she is feeling better now. She is starting with OP PT today. She has been able to get up and move around and doing the exercises PT went over prior to D/C. Dressing looks good no issues or questions. Pain is controlled with the medications. Ms. Huff doesn’t have any questions for me at this time. She was given my contact information should  she need anything. she voiced understanding and appreciated the call.

## 2022-11-08 ENCOUNTER — TELEPHONE (OUTPATIENT)
Dept: ORTHOPEDIC SURGERY | Facility: HOSPITAL | Age: 82
End: 2022-11-08

## 2022-11-08 NOTE — TELEPHONE ENCOUNTER
Called and spoke with Ms. Huff to see how she is doing as she is 2 weeks SP RTH.  She said she is doing great. She feels so good. She had some N/V that took her to the ER right after her surgery; that has since resolved. She is working with PT and progressing well. She is off the pain medication and doesn’t have any pain. If she needs anything at all she takes a Tylenol. She is amazed at how good she feels and that the pain is just gone. Ms. Huff doesn’t have any other questions for me at this time. She has my contact information should she need anything. She voiced understanding and appreciated the call.

## 2023-08-02 ENCOUNTER — PREP FOR SURGERY (OUTPATIENT)
Dept: OTHER | Facility: HOSPITAL | Age: 83
End: 2023-08-02
Payer: MEDICARE

## 2023-08-02 DIAGNOSIS — M16.12 PRIMARY OSTEOARTHRITIS OF LEFT HIP: Primary | ICD-10-CM

## 2023-08-02 RX ORDER — PREGABALIN 75 MG/1
75 CAPSULE ORAL ONCE
OUTPATIENT
Start: 2023-08-02 | End: 2023-08-02

## 2023-08-02 RX ORDER — CHLORHEXIDINE GLUCONATE 500 MG/1
CLOTH TOPICAL
OUTPATIENT
Start: 2023-08-02 | End: 2023-08-02

## 2023-08-02 RX ORDER — ACETAMINOPHEN 10 MG/ML
1000 INJECTION, SOLUTION INTRAVENOUS ONCE
OUTPATIENT
Start: 2023-08-02 | End: 2023-08-02

## 2023-08-02 RX ORDER — CEFAZOLIN SODIUM 2 G/100ML
2 INJECTION, SOLUTION INTRAVENOUS ONCE
OUTPATIENT
Start: 2023-08-02 | End: 2023-08-02

## 2023-08-02 RX ORDER — CHLORHEXIDINE GLUCONATE 500 MG/1
CLOTH TOPICAL ONCE
OUTPATIENT
Start: 2023-08-02

## 2023-08-02 RX ORDER — TRANEXAMIC ACID 10 MG/ML
1000 INJECTION, SOLUTION INTRAVENOUS ONCE
OUTPATIENT
Start: 2023-08-02 | End: 2023-08-02

## 2023-09-14 ENCOUNTER — TELEPHONE (OUTPATIENT)
Dept: ORTHOPEDIC SURGERY | Facility: HOSPITAL | Age: 83
End: 2023-09-14
Payer: MEDICARE

## 2023-09-14 NOTE — TELEPHONE ENCOUNTER
Risk Factor yes no   Age >75 X    BMI <20 >40 X    Patient History     Chronic Pain (2 or more meds/Pain Management)  X   ETOH (more than 3 drinks Daily)  X   Uncontrolled Depression/Bipolar/Schizoaffective Disorder  X   Arrhythmias--  X   Stent placement/MI  X   DVT/PE  X   Pacemaker  X   HTN (uncontrolled or requiring more than 2 medications) X    CHF/Retained fluids/Edema  X   Stroke with Residual   X   COPD/Asthma X    COLUMBA--Non-compliant with CPAP  X   Diabetes (on insulin or more than 2 meds)         A1C:  X   BPH/Urinary retention (on medication)     CKD  X   Home environment and support     Current ambulation status (use of cane, walker, W/C, Multiple falls/weakness)  X   Stairs to enter and throughout home  X   Lives Alone  X   Doesn't have support at home  X   Outpatient Screening Assessment    Home needs: (Walker/BSC):  Has walker  ? Steps No steps   Caregiver 24-48hrs post-discharge: Son and Daughter in Law     Discharge Plan:   OP PT     Prescriptions: Other pharmacy    Home medications:   [] Blood thinner/anti-coag therapy--   [] BPH or diuretic--  ? BP meds-- Cardizem, Lisinopril, Metoprolol  [] Pain/Anti-inflammatories--  Pre-op Education:  Educate patient on spinal anesthesia/pain control:  ? patient verbalize understanding    Educate patient on hospital course/timeline:  ?  patient verbalize understanding    Joint Care Class:  ?  yes [] no  Had the other one done last year   Notes:   Went to have her Pre Op Done at a local hospital--awaiting results

## 2023-09-20 NOTE — H&P
Chief Complaint  Left hip pain  Patient's Pharmacies  [id number] ([facility]): [address] [place] [address], Ph [phone], Fax [phone]  Vitals  2023-08-02 15:01  Ht: 5 ft 4 in  Wt: 112 lbs  BMI: 19.2  Body Surface Area: 1.51 m²  Allergies  Reviewed Allergies  NKDA  Medications  Reviewed Medications  aspirin 81 mg tablet,delayed release  TAKE 1 TABLET BY MOUTH EVERY DAY  10/28/22   filled surescripts  dilTIAZem  mg capsule,extended release 24 hr  TAKE 1 CAPSULE BY MOUTH EVERY DAY  05/15/23   filled surescripts  docusate sodium 100 mg capsule  TAKE 1 CAPSULE BY MOUTH 2 TIMES A DAY FOR 15 DAYS  10/28/22   filled surescripts  famotidine 40 mg tablet  TAKE 1 TABLET BY MOUTH EVERYDAY AT BEDTIME  07/10/23   filled surescripts  fluticasone propionate 50 mcg/actuation nasal spray,suspension  USE 1 SPRAY IN EACH NOSTRIL TWICE A DAY AS DIRECTED  12/12/22   filled surescripts  HYDROcodone 7.5 mg-acetaminophen 325 mg tablet  TAKE 1 TABLET BY MOUTH EVERY 4 HOURS AS NEEDED FOR MODERATE PAIN FOR UP TO 7 DAYS.  10/28/22   filled surescripts  levothyroxine 50 mcg tablet  TAKE 1 TO 1 1/2 TABLETS ON MON,WED, FRI & 1 TABLET ON SUN, TUE, THUR, SAT  06/12/23   filled surescripts  lisinopriL 10 mg tablet  TAKE 1 TABLET BY MOUTH EVERY DAY FOR 90 DAYS  06/27/23   filled surescripts  methylPREDNISolone 4 mg tablets in a dose pack  TAKE 6 TABLETS ON DAY 1 AS DIRECTED ON PACKAGE AND DECREASE BY 1 TAB EACH DAY FOR A TOTAL OF 6 DAYS  06/21/22   filled surescripts  metoprolol succinate ER 50 mg tablet,extended release 24 hr  TAKE 1/2 TABLET BY MOUTH DAILY FOR 90 DAYS  05/15/23   filled surescripts  miSOPROStoL 200 mcg tablet  TAKE 1 TABLET BY MOUTH TWICE A DAY  07/24/23   filled surescripts  montelukast 10 mg tablet  TAKE 1 TABLET BY MOUTH EVERY DAY  05/15/23   filled surescripts  nitrofurantoin monohydrate/macrocrystals 100 mg capsule  TAKE 1 CAPSULE BY MOUTH TWICE A DAY FOR UTI  10/31/22    filled surescripts  ondansetron 4 mg disintegrating tablet  DISSOLVE 1 TABLET ON TONGUE EVERY 4 HOURS AS NEEDED FOR NAUSEA/VOMITING FOR 3 DAYS  04/20/23   filled surescripts  ondansetron HCL 4 mg tablet  TAKE 1 TABLET BY MOUTH EVERY 8 HOURS AS NEEDED FOR NAUSEA OR VOMITING.  10/28/22   filled surescripts  rosuvastatin 5 mg tablet  TAKE 1 TABLET BY MOUTH EVERY DAY  06/08/23   filled surescripts  simvastatin 80 mg tablet  TAKE 1 TABLET BY MOUTH EVERY DAY IN THE EVENING  05/31/22   filled surescripts  sodium,potassium,mag sulfates 17.5 gram-3.13 gram-1.6 gram oral soln  TAKE AS DIRECTED  02/05/23   filled surescripts  Vitamin B-6 100 mg tablet  TAKE 1 TABLET BY MOUTH EVERY DAY FOR 90 DAYS  07/19/22   filled surescripts  Vaccines  Reviewed Vaccines  Vaccine Type Date Amt. Route Site NDC Lot # Mfr. Exp.  Date VIS VIS  Given Vaccinator  Influenza  influenza, injectable, quadrivalent 11/01/21            Problems  Reviewed Problems  Aftercare - Onset: 11/22/2022  Osteoarthritis of right hip joint - Onset: 10/24/2022  History of total replacement of right hip joint - Onset: 11/22/2022  Osteoarthritis of left hip joint - Onset: 08/02/2023  Family History  Reviewed Family History  Social History  Reviewed Social History  Public Health and Travel  Have you recently traveled abroad?: No  Have you been to an area known to be high risk for COVID-19?: No  In the 14 days before symptom onset, have you had close contact with a laboratory-confirmed COVID-19 while that case was ill?: No  In the 14 days before symptom onset, have you had close contact with a person who is under investigation for COVID-19 while that person was ill?: No  Substance Use  Do you or have you ever smoked tobacco?: Never smoker  Do you or have you ever used any other forms of tobacco or nicotine?: No  Do you or have you ever used e-cigarettes or vape?: Never used electronic cigarettes  Has tobacco cessation counseling been provided?: No  What is your level of  alcohol consumption?: None  Do you use any illicit or recreational drugs?: No  Advance Directive  Do you have an advance directive?: No  Do you have a medical power of ?: No  Surgical History  Reviewed Surgical History  Hip Surgery - 10/01/2023  Hysterectomy - 01/01/2012  Hysterectomy - 05/01/2006  Past Medical History  Reviewed Past Medical History  Arthritis: Y  Asthma: Y  Gastrointestinal Disease/GERD: Y  High Cholesterol: Y  Hypertension: Y  Thyroid Disease: Y  HPI  Conchita is a 83-year-old female who comes into the office today for evaluation and management of her left hip pain.  She has been dealing with this pain for several years. She has mostly pain in the right groin area at times it radiates to the left thigh and knee.    She is known to me from her right total hip replacement for arthritis and this is doing well.    Patient states that her pain has been worsening recently and she has difficulty ambulating any distances. She has been using a cane for the past 3 to 4 weeks. She is here in the office with a walker today.    She states that she has difficulty putting her shoes and socks on difficulty getting in and out of her car.  Denies any history of MRSA, DVT, cardiac problems.  She is accompanied by her daughter-in-law to the office visit.  ROS  Patient reports arthralgias/joint pain. He reports NO CONSTITUTIONAL SYMPTOMS. He reports NO EYE SYMPTOMS. He reports NO EAR SYMPTOMS. He reports NO NOSE SYMPTOMS. He reports NO MOUTH/THROAT SYMPTOMS. He reports NO CARDIOVASCULAR SYMPTOMS. He reports NO RESPIRATORY SYMPTOMS. He reports NO GASTROINTESTINAL SYMPTOMS. He reports NO GENITOURINARY SYMPTOMS. He reports NO SKIN SYMPTOMS. He reports NO NEUROLOGIC SYMPTOMS. He reports NO PSYCH SYMPTOMS. He reports NO ENDOCRINE SYMPTOMS. He reports NO HEMATOLOGIC / LYMPHATIC SYMPTOMS. He reports NO ALLERGY / IMMUNOLOGIC SYMPTOMS.  ROS as noted in the HPI  Physical Exam  GAIT antalgic, no assist  Left  hip  Normal: Neurovascular status is intact. Sensation in hip is normal. DP Pulse is 3+. PT PULSE is 3+. Capillary Refill is normal. Reflexes are normal.  ROM  Internal Rotation: <30  External Rotation: <40  Abduction: <45  Adduction: <15  Flexion: <100  Extension: <5    Tenderness  Location: groin  Radiation of Pain: anterior thigh  Pain w/ Palpation: none  Katerina Test: negative  Impingement: negative  Straight Leg Raise: negative    Strength  Quad: normal  Abduction: normal  Adduction: normal  Flexion: normal  Extension: normal  Positive Stinchfield sign.  Positive Trendelenburg sign.  Attempted movements of the hip are painful and restricted  Assessment / Plan  1. Follow-up orthopedic assessment  Z47.89: Encounter for other orthopedic aftercare    2. Osteoarthritis of left hip joint  M16.12: Unilateral primary osteoarthritis, left hip  XR, HIP + PELVIS, UNILATERAL, 2 OR 3 VIEW  Side: BILATERAL  MEDICAL CLEARANCE* -     Note to Provider: Scheduled for Total HIP replacement at [facility]. Please provide medical clearance.    XR, HIP + PELVIS, UNILATERAL, 2 OR 3 VIEW  Side: BILATERAL  Result:  - XRAY 1 HIP + PELVIS 2-3 VIEW: Performed  Result Note: Complete loss of joint space in the left hip weightbearing area with bone-on-bone arthritis, subchondral sclerosis, collapsed femoral head. Positive femoral head osteophyte formation.  Discussion Notes  Options and alternatives were discussed in detail with the patient.  The patient has reached the point of disability and failed nonoperative management.  The patient is indicated for a left total hip replacement .  The likely Risks and benefits of the procedure including but not limited to infection, DVT, pulmonary embolism, recurrent dislocation, Leg length discrepancy, periprosthetic fractures, possibility of injury to nerves or vessels, tendons, possibility of morbidity and mortality likely medical risks for stroke and heart attack, have been discussed in  detail.  Despite the risks involved the patient would like to proceed. The patient is being scheduled for a left total HIP arthroplasty by DIRECT ANTERIOR APPROACH at [facility] on September 22,2023.  I will request for Medical and cardiac clearance from Dr. Silverio MD .  Postoperative DVT prophylaxis - Patient has no high risk factors Plan for ASPIRIN  Preoperative antibiotic prophylaxis - Plan for SCIP protocol with CEFAZOLIN weight based.  Plan for same-day surgery

## 2023-09-21 RX ORDER — ROSUVASTATIN CALCIUM 5 MG/1
1 TABLET, COATED ORAL DAILY
COMMUNITY
Start: 2023-09-06

## 2023-09-21 NOTE — CASE MANAGEMENT/SOCIAL WORK
Continued Stay Note  [facility]     Patient Name: Conchita Huff  MRN: [id number]  Today's Date: 9/21/2023    Admit Date: (Not on file)    Plan: Home and will do outpatient PT   Discharge Plan       Plan       Row Name 09/21/23 1702    Plan Home and will do outpatient PT                           Discharge Codes    No documentation.                       Shannon Epley, RN

## 2023-09-22 ENCOUNTER — APPOINTMENT (OUTPATIENT)
Dept: GENERAL RADIOLOGY | Facility: HOSPITAL | Age: 83
End: 2023-09-22
Payer: MEDICARE

## 2023-09-22 ENCOUNTER — ANESTHESIA (OUTPATIENT)
Dept: PERIOP | Facility: HOSPITAL | Age: 83
End: 2023-09-22
Payer: MEDICARE

## 2023-09-22 ENCOUNTER — HOSPITAL ENCOUNTER (OUTPATIENT)
Facility: HOSPITAL | Age: 83
Setting detail: HOSPITAL OUTPATIENT SURGERY
Discharge: HOME HEALTH | End: 2023-09-22
Attending: ORTHOPAEDIC SURGERY | Admitting: ORTHOPAEDIC SURGERY
Payer: MEDICARE

## 2023-09-22 ENCOUNTER — ANESTHESIA EVENT (OUTPATIENT)
Dept: PERIOP | Facility: HOSPITAL | Age: 83
End: 2023-09-22
Payer: MEDICARE

## 2023-09-22 VITALS
DIASTOLIC BLOOD PRESSURE: 59 MMHG | RESPIRATION RATE: 16 BRPM | SYSTOLIC BLOOD PRESSURE: 114 MMHG | OXYGEN SATURATION: 100 % | TEMPERATURE: 97.7 F | HEART RATE: 52 BPM | WEIGHT: 110 LBS | HEIGHT: 63 IN | BODY MASS INDEX: 19.49 KG/M2

## 2023-09-22 DIAGNOSIS — M16.12 PRIMARY OSTEOARTHRITIS OF LEFT HIP: ICD-10-CM

## 2023-09-22 DIAGNOSIS — Z96.642 STATUS POST TOTAL HIP REPLACEMENT, LEFT: Primary | ICD-10-CM

## 2023-09-22 PROCEDURE — 97110 THERAPEUTIC EXERCISES: CPT | Performed by: PHYSICAL THERAPIST

## 2023-09-22 PROCEDURE — 97162 PT EVAL MOD COMPLEX 30 MIN: CPT | Performed by: PHYSICAL THERAPIST

## 2023-09-22 PROCEDURE — 25010000002 CEFAZOLIN IN DEXTROSE 2-4 GM/100ML-% SOLUTION: Performed by: ORTHOPAEDIC SURGERY

## 2023-09-22 PROCEDURE — 76000 FLUOROSCOPY <1 HR PHYS/QHP: CPT

## 2023-09-22 PROCEDURE — C1776 JOINT DEVICE (IMPLANTABLE): HCPCS | Performed by: ORTHOPAEDIC SURGERY

## 2023-09-22 PROCEDURE — 25010000002 PROPOFOL 10 MG/ML EMULSION: Performed by: REGISTERED NURSE

## 2023-09-22 PROCEDURE — 73501 X-RAY EXAM HIP UNI 1 VIEW: CPT

## 2023-09-22 PROCEDURE — 25010000002 KETOROLAC TROMETHAMINE PER 15 MG: Performed by: ORTHOPAEDIC SURGERY

## 2023-09-22 PROCEDURE — 25010000002 ROPIVACAINE PER 1 MG: Performed by: ORTHOPAEDIC SURGERY

## 2023-09-22 PROCEDURE — 25010000002 CLONIDINE PER 1 MG: Performed by: ORTHOPAEDIC SURGERY

## 2023-09-22 PROCEDURE — 25010000002 BUPIVACAINE PF 0.75 % SOLUTION: Performed by: ANESTHESIOLOGY

## 2023-09-22 DEVICE — DEV CONTRL TISS STRATAFIX SPIRAL MNCRYL UD 3/0 PLS 30CM: Type: IMPLANTABLE DEVICE | Site: HIP | Status: FUNCTIONAL

## 2023-09-22 DEVICE — BIOLOX DELTA CERAMIC FEMORAL HEAD 32MM DIA +5.0 12/14 TAPER
Type: IMPLANTABLE DEVICE | Site: HIP | Status: FUNCTIONAL
Brand: BIOLOX DELTA

## 2023-09-22 DEVICE — TOTL HIP COP DEPUY 9641334: Type: IMPLANTABLE DEVICE | Status: FUNCTIONAL

## 2023-09-22 DEVICE — IMPLANTABLE DEVICE
Type: IMPLANTABLE DEVICE | Site: HIP | Status: FUNCTIONAL
Brand: SURGIFOAM® ABSORBABLE GELATIN SPONGE, U.S.P.

## 2023-09-22 DEVICE — SUT FW #2 W/TPR NDL 1/2 CIR 38IN 97CM 26.5MM BLU: Type: IMPLANTABLE DEVICE | Site: HIP | Status: FUNCTIONAL

## 2023-09-22 DEVICE — PINNACLE POROCOAT ACETABULAR SHELL SECTOR II 48MM OD
Type: IMPLANTABLE DEVICE | Site: HIP | Status: FUNCTIONAL
Brand: PINNACLE POROCOAT

## 2023-09-22 DEVICE — PINNACLE HIP SOLUTIONS ALTRX POLYETHYLENE ACETABULAR LINER NEUTRAL 32MM ID 48MM OD
Type: IMPLANTABLE DEVICE | Site: HIP | Status: FUNCTIONAL
Brand: PINNACLE ALTRX

## 2023-09-22 DEVICE — DEV CONTRL TISS STRATAFIX SPIRAL PDS PLS CT1 2-0 45CM: Type: IMPLANTABLE DEVICE | Site: HIP | Status: FUNCTIONAL

## 2023-09-22 DEVICE — ACTIS DUOFIX HIP PROSTHESIS (FEMORAL STEM 12/14 TAPER CEMENTLESS SIZE 4 STD COLLAR)  CE
Type: IMPLANTABLE DEVICE | Site: HIP | Status: FUNCTIONAL
Brand: ACTIS

## 2023-09-22 RX ORDER — MELOXICAM 15 MG/1
7.5 TABLET ORAL ONCE
Status: CANCELLED | OUTPATIENT
Start: 2023-09-22 | End: 2023-09-22

## 2023-09-22 RX ORDER — ONDANSETRON 2 MG/ML
4 INJECTION INTRAMUSCULAR; INTRAVENOUS ONCE AS NEEDED
Status: DISCONTINUED | OUTPATIENT
Start: 2023-09-22 | End: 2023-09-22 | Stop reason: HOSPADM

## 2023-09-22 RX ORDER — HYDROCODONE BITARTRATE AND ACETAMINOPHEN 5; 325 MG/1; MG/1
1 TABLET ORAL ONCE AS NEEDED
Status: DISCONTINUED | OUTPATIENT
Start: 2023-09-22 | End: 2023-09-22 | Stop reason: HOSPADM

## 2023-09-22 RX ORDER — MAGNESIUM HYDROXIDE 1200 MG/15ML
LIQUID ORAL AS NEEDED
Status: DISCONTINUED | OUTPATIENT
Start: 2023-09-22 | End: 2023-09-22 | Stop reason: HOSPADM

## 2023-09-22 RX ORDER — TRAMADOL HYDROCHLORIDE 50 MG/1
50 TABLET ORAL EVERY 8 HOURS PRN
Qty: 30 TABLET | Refills: 0 | Status: SHIPPED | OUTPATIENT
Start: 2023-09-22

## 2023-09-22 RX ORDER — TRANEXAMIC ACID 10 MG/ML
1000 INJECTION, SOLUTION INTRAVENOUS ONCE
Status: DISCONTINUED | OUTPATIENT
Start: 2023-09-22 | End: 2023-09-22 | Stop reason: HOSPADM

## 2023-09-22 RX ORDER — ASPIRIN 81 MG/1
81 TABLET ORAL 2 TIMES DAILY
Status: CANCELLED | OUTPATIENT
Start: 2023-09-22 | End: 2023-10-23

## 2023-09-22 RX ORDER — PROMETHAZINE HYDROCHLORIDE 25 MG/1
25 SUPPOSITORY RECTAL ONCE AS NEEDED
Status: DISCONTINUED | OUTPATIENT
Start: 2023-09-22 | End: 2023-09-22 | Stop reason: HOSPADM

## 2023-09-22 RX ORDER — PROMETHAZINE HYDROCHLORIDE 25 MG/1
25 TABLET ORAL ONCE AS NEEDED
Status: DISCONTINUED | OUTPATIENT
Start: 2023-09-22 | End: 2023-09-22 | Stop reason: HOSPADM

## 2023-09-22 RX ORDER — PREGABALIN 75 MG/1
75 CAPSULE ORAL ONCE
Status: COMPLETED | OUTPATIENT
Start: 2023-09-22 | End: 2023-09-22

## 2023-09-22 RX ORDER — ONDANSETRON 4 MG/1
4 TABLET, FILM COATED ORAL EVERY 8 HOURS PRN
Qty: 30 TABLET | Refills: 0 | Status: SHIPPED | OUTPATIENT
Start: 2023-09-22

## 2023-09-22 RX ORDER — CEFAZOLIN SODIUM 2 G/100ML
2 INJECTION, SOLUTION INTRAVENOUS EVERY 8 HOURS
Status: CANCELLED | OUTPATIENT
Start: 2023-09-22 | End: 2023-09-23

## 2023-09-22 RX ORDER — ONDANSETRON 2 MG/ML
4 INJECTION INTRAMUSCULAR; INTRAVENOUS ONCE AS NEEDED
Status: CANCELLED | OUTPATIENT
Start: 2023-09-22

## 2023-09-22 RX ORDER — DROPERIDOL 2.5 MG/ML
0.62 INJECTION, SOLUTION INTRAMUSCULAR; INTRAVENOUS
Status: DISCONTINUED | OUTPATIENT
Start: 2023-09-22 | End: 2023-09-22 | Stop reason: HOSPADM

## 2023-09-22 RX ORDER — SODIUM CHLORIDE, SODIUM LACTATE, POTASSIUM CHLORIDE, CALCIUM CHLORIDE 600; 310; 30; 20 MG/100ML; MG/100ML; MG/100ML; MG/100ML
9 INJECTION, SOLUTION INTRAVENOUS CONTINUOUS
Status: DISCONTINUED | OUTPATIENT
Start: 2023-09-22 | End: 2023-09-22 | Stop reason: HOSPADM

## 2023-09-22 RX ORDER — ACETAMINOPHEN 500 MG
1000 TABLET ORAL ONCE
Status: CANCELLED | OUTPATIENT
Start: 2023-09-22 | End: 2023-09-22

## 2023-09-22 RX ORDER — DIPHENHYDRAMINE HYDROCHLORIDE 50 MG/ML
12.5 INJECTION INTRAMUSCULAR; INTRAVENOUS
Status: DISCONTINUED | OUTPATIENT
Start: 2023-09-22 | End: 2023-09-22 | Stop reason: HOSPADM

## 2023-09-22 RX ORDER — CEFAZOLIN SODIUM 2 G/100ML
2 INJECTION, SOLUTION INTRAVENOUS ONCE
Status: COMPLETED | OUTPATIENT
Start: 2023-09-22 | End: 2023-09-22

## 2023-09-22 RX ORDER — BUPIVACAINE HYDROCHLORIDE 7.5 MG/ML
INJECTION, SOLUTION EPIDURAL; RETROBULBAR
Status: COMPLETED | OUTPATIENT
Start: 2023-09-22 | End: 2023-09-22

## 2023-09-22 RX ORDER — FENTANYL CITRATE 50 UG/ML
50 INJECTION, SOLUTION INTRAMUSCULAR; INTRAVENOUS ONCE AS NEEDED
Status: DISCONTINUED | OUTPATIENT
Start: 2023-09-22 | End: 2023-09-22 | Stop reason: HOSPADM

## 2023-09-22 RX ORDER — FAMOTIDINE 10 MG/ML
20 INJECTION, SOLUTION INTRAVENOUS ONCE
Status: COMPLETED | OUTPATIENT
Start: 2023-09-22 | End: 2023-09-22

## 2023-09-22 RX ORDER — EPHEDRINE SULFATE 50 MG/ML
INJECTION INTRAVENOUS AS NEEDED
Status: DISCONTINUED | OUTPATIENT
Start: 2023-09-22 | End: 2023-09-22 | Stop reason: SURG

## 2023-09-22 RX ORDER — ONDANSETRON 4 MG/1
4 TABLET, FILM COATED ORAL ONCE AS NEEDED
Status: CANCELLED | OUTPATIENT
Start: 2023-09-22

## 2023-09-22 RX ORDER — CHLORHEXIDINE GLUCONATE 500 MG/1
CLOTH TOPICAL ONCE
Status: COMPLETED | OUTPATIENT
Start: 2023-09-22 | End: 2023-09-22

## 2023-09-22 RX ORDER — TRANEXAMIC ACID 100 MG/ML
INJECTION, SOLUTION INTRAVENOUS AS NEEDED
Status: DISCONTINUED | OUTPATIENT
Start: 2023-09-22 | End: 2023-09-22 | Stop reason: SURG

## 2023-09-22 RX ORDER — MIDAZOLAM HYDROCHLORIDE 1 MG/ML
0.5 INJECTION INTRAMUSCULAR; INTRAVENOUS
Status: DISCONTINUED | OUTPATIENT
Start: 2023-09-22 | End: 2023-09-22 | Stop reason: HOSPADM

## 2023-09-22 RX ORDER — NALOXONE HCL 0.4 MG/ML
0.2 VIAL (ML) INJECTION AS NEEDED
Status: DISCONTINUED | OUTPATIENT
Start: 2023-09-22 | End: 2023-09-22 | Stop reason: HOSPADM

## 2023-09-22 RX ORDER — HYDROMORPHONE HYDROCHLORIDE 1 MG/ML
0.25 INJECTION, SOLUTION INTRAMUSCULAR; INTRAVENOUS; SUBCUTANEOUS
Status: DISCONTINUED | OUTPATIENT
Start: 2023-09-22 | End: 2023-09-22 | Stop reason: HOSPADM

## 2023-09-22 RX ORDER — EPHEDRINE SULFATE 50 MG/ML
5 INJECTION, SOLUTION INTRAVENOUS ONCE AS NEEDED
Status: DISCONTINUED | OUTPATIENT
Start: 2023-09-22 | End: 2023-09-22 | Stop reason: HOSPADM

## 2023-09-22 RX ORDER — DOCUSATE SODIUM 100 MG/1
100 CAPSULE, LIQUID FILLED ORAL 2 TIMES DAILY
Qty: 30 CAPSULE | Refills: 0 | Status: SHIPPED | OUTPATIENT
Start: 2023-09-22 | End: 2023-10-07

## 2023-09-22 RX ORDER — SODIUM CHLORIDE 0.9 % (FLUSH) 0.9 %
3-10 SYRINGE (ML) INJECTION AS NEEDED
Status: DISCONTINUED | OUTPATIENT
Start: 2023-09-22 | End: 2023-09-22 | Stop reason: HOSPADM

## 2023-09-22 RX ORDER — ASPIRIN 81 MG/1
81 TABLET ORAL 2 TIMES DAILY
Qty: 60 TABLET | Refills: 0 | Status: SHIPPED | OUTPATIENT
Start: 2023-09-22 | End: 2023-10-22

## 2023-09-22 RX ORDER — IPRATROPIUM BROMIDE AND ALBUTEROL SULFATE 2.5; .5 MG/3ML; MG/3ML
3 SOLUTION RESPIRATORY (INHALATION) ONCE AS NEEDED
Status: DISCONTINUED | OUTPATIENT
Start: 2023-09-22 | End: 2023-09-22 | Stop reason: HOSPADM

## 2023-09-22 RX ORDER — FENTANYL CITRATE 50 UG/ML
25 INJECTION, SOLUTION INTRAMUSCULAR; INTRAVENOUS
Status: DISCONTINUED | OUTPATIENT
Start: 2023-09-22 | End: 2023-09-22 | Stop reason: HOSPADM

## 2023-09-22 RX ORDER — DOCUSATE SODIUM 100 MG/1
100 CAPSULE, LIQUID FILLED ORAL ONCE
Status: CANCELLED | OUTPATIENT
Start: 2023-09-22 | End: 2023-09-22

## 2023-09-22 RX ORDER — HYDROCODONE BITARTRATE AND ACETAMINOPHEN 7.5; 325 MG/1; MG/1
1 TABLET ORAL EVERY 4 HOURS PRN
Status: DISCONTINUED | OUTPATIENT
Start: 2023-09-22 | End: 2023-09-22 | Stop reason: HOSPADM

## 2023-09-22 RX ORDER — PROPOFOL 10 MG/ML
VIAL (ML) INTRAVENOUS AS NEEDED
Status: DISCONTINUED | OUTPATIENT
Start: 2023-09-22 | End: 2023-09-22 | Stop reason: SURG

## 2023-09-22 RX ORDER — FLUMAZENIL 0.1 MG/ML
0.2 INJECTION INTRAVENOUS AS NEEDED
Status: DISCONTINUED | OUTPATIENT
Start: 2023-09-22 | End: 2023-09-22 | Stop reason: HOSPADM

## 2023-09-22 RX ORDER — CHLORHEXIDINE GLUCONATE 500 MG/1
CLOTH TOPICAL
Status: ACTIVE | OUTPATIENT
Start: 2023-09-22 | End: 2023-09-22

## 2023-09-22 RX ORDER — LIDOCAINE HYDROCHLORIDE 10 MG/ML
0.5 INJECTION, SOLUTION INFILTRATION; PERINEURAL ONCE AS NEEDED
Status: DISCONTINUED | OUTPATIENT
Start: 2023-09-22 | End: 2023-09-22 | Stop reason: HOSPADM

## 2023-09-22 RX ORDER — HYDRALAZINE HYDROCHLORIDE 20 MG/ML
5 INJECTION INTRAMUSCULAR; INTRAVENOUS
Status: DISCONTINUED | OUTPATIENT
Start: 2023-09-22 | End: 2023-09-22 | Stop reason: HOSPADM

## 2023-09-22 RX ORDER — LIDOCAINE HYDROCHLORIDE 20 MG/ML
INJECTION, SOLUTION INFILTRATION; PERINEURAL AS NEEDED
Status: DISCONTINUED | OUTPATIENT
Start: 2023-09-22 | End: 2023-09-22 | Stop reason: SURG

## 2023-09-22 RX ORDER — ASPIRIN 81 MG/1
81 TABLET ORAL DAILY
Status: CANCELLED | OUTPATIENT
Start: 2023-09-23 | End: 2023-10-23

## 2023-09-22 RX ORDER — LABETALOL HYDROCHLORIDE 5 MG/ML
5 INJECTION, SOLUTION INTRAVENOUS
Status: DISCONTINUED | OUTPATIENT
Start: 2023-09-22 | End: 2023-09-22 | Stop reason: HOSPADM

## 2023-09-22 RX ORDER — SODIUM CHLORIDE 0.9 % (FLUSH) 0.9 %
3 SYRINGE (ML) INJECTION EVERY 12 HOURS SCHEDULED
Status: DISCONTINUED | OUTPATIENT
Start: 2023-09-22 | End: 2023-09-22 | Stop reason: HOSPADM

## 2023-09-22 RX ADMIN — PROPOFOL 50 MCG/KG/MIN: 10 INJECTION, EMULSION INTRAVENOUS at 08:49

## 2023-09-22 RX ADMIN — TRANEXAMIC ACID 1000 MG: 100 INJECTION INTRAVENOUS at 09:53

## 2023-09-22 RX ADMIN — SODIUM CHLORIDE, POTASSIUM CHLORIDE, SODIUM LACTATE AND CALCIUM CHLORIDE 9 ML/HR: 600; 310; 30; 20 INJECTION, SOLUTION INTRAVENOUS at 07:07

## 2023-09-22 RX ADMIN — EPHEDRINE SULFATE 5 MG: 50 INJECTION INTRAVENOUS at 09:28

## 2023-09-22 RX ADMIN — CHLORHEXIDINE GLUCONATE: 500 CLOTH TOPICAL at 06:54

## 2023-09-22 RX ADMIN — PROPOFOL 50 MG: 10 INJECTION, EMULSION INTRAVENOUS at 08:52

## 2023-09-22 RX ADMIN — FAMOTIDINE 20 MG: 10 INJECTION INTRAVENOUS at 07:12

## 2023-09-22 RX ADMIN — EPHEDRINE SULFATE 5 MG: 50 INJECTION INTRAVENOUS at 08:59

## 2023-09-22 RX ADMIN — CEFAZOLIN SODIUM 2 G: 2 INJECTION, SOLUTION INTRAVENOUS at 08:31

## 2023-09-22 RX ADMIN — TRANEXAMIC ACID 1000 MG: 100 INJECTION INTRAVENOUS at 08:56

## 2023-09-22 RX ADMIN — BUPIVACAINE HYDROCHLORIDE 1.4 ML: 7.5 INJECTION, SOLUTION EPIDURAL; RETROBULBAR at 08:43

## 2023-09-22 RX ADMIN — LIDOCAINE HYDROCHLORIDE 40 MG: 20 INJECTION, SOLUTION INFILTRATION; PERINEURAL at 08:49

## 2023-09-22 RX ADMIN — PREGABALIN 75 MG: 75 CAPSULE ORAL at 06:53

## 2023-09-22 RX ADMIN — EPHEDRINE SULFATE 5 MG: 50 INJECTION INTRAVENOUS at 09:18

## 2023-09-22 NOTE — NURSING NOTE
Pt attempted to walk with physical therapy and was doing well but while walking back to room she felt light headed. Pt placed in chair and then was able to ambulate to bed with Rn and pt assistance. Vital signs checked and are stable. Pt given some apple sauce and water while resting in bed.  Pt reports she feels better after sitting down.

## 2023-09-22 NOTE — NURSING NOTE
Pt had crackers, peanut butter, and applesauce. Pt reported she wanted to walk because she wanted to go home. Pt was able to walk in hallway and back to bed and reports she feels better. Pt and family educated on proper care and what to do to help with dizziness. Pt's family reports they are comfortable taken her home.

## 2023-09-22 NOTE — PLAN OF CARE
Goal Outcome Evaluation:              Outcome Evaluation: Pt is s/p L CLIFF and is WBAT. Pt presents with pain, limited ROM and antalgic gait. She ambulated safely with Rwx and was educated on navigating 1 step in house. Pt became lightheaded and was rolled back to the room in a chair.  Pt verbalized undestanding after having previous hip done.  Pt  and family was educated on HEP, use of AD and stairs. Pt demonstrated independence with HEP and was given a written copy. Pt is safe to d/c home when medically stable. PT will sign off.      Anticipated Discharge Disposition (PT): home with assist, home with home health

## 2023-09-22 NOTE — THERAPY EVALUATION
Patient Name: Conchita Huff  : 1940    MRN: [id number]                              Today's Date: 2023       Admit Date: 2023    Visit Dx:     ICD-10-CM ICD-9-CM   1. Status post total hip replacement, left  Z96.642 V43.64   2. Primary osteoarthritis of left hip  M16.12 715.15     Patient Active Problem List   Diagnosis    Status post total hip replacement, right    Status post total hip replacement, left     Past Medical History:   Diagnosis Date    Arthritis     Asthma     Disease of thyroid gland     Hypertension     Right hip pain      Past Surgical History:   Procedure Laterality Date    COLONOSCOPY      EYE SURGERY      HYSTERECTOMY      TOTAL HIP ARTHROPLASTY Right 10/28/2022    Procedure: TOTAL HIP ARTHROPLASTY ANTERIOR WITH HANA TABLE;  Surgeon: Onel Irvin MD;  Location[place] [facility] [place]  Service: Orthopedics;  Laterality: Right;      General Information       Row Name 23          Physical Therapy Time and Intention    Document Type evaluation  -     Mode of Treatment individual therapy  -       Row Name 23          General Information    Patient Profile Reviewed yes  -     Prior Level of Function independent:  -     Existing Precautions/Restrictions no known precautions/restrictions  -     Barriers to Rehab none identified  -       Row Name 23          Living Environment    People in Home spouse  -       Row Name 23          Home Main Entrance    Number of Stairs, Main Entrance one  -     Stair Railings, Main Entrance none  -       Row Name 23          Cognition    Orientation Status (Cognition) oriented x 4  -               User Key  (r) = Recorded By, (t) = Taken By, (c) = Cosigned By      Initials Name Provider Type     Lindsey Grijalva, PT Physical Therapist                   Mobility       Row Name 235          Bed Mobility    Bed Mobility supine-sit;sit-supine  -      Supine-Sit [place] (Bed Mobility) independent  -     Sit-Supine [place] (Bed Mobility) independent  -       Row Name 09/22/23 1654          Sit-Stand Transfer    Sit-Stand [place] (Transfers) standby assist  -     Assistive Device (Sit-Stand Transfers) walker, front-wheeled  -[facility] Name 09/22/23 1654          Gait/Stairs (Locomotion)    [place] Level (Gait) standby assist;contact guard  -     Assistive Device (Gait) walker, 4-wheeled  -     Distance in Feet (Gait) 55 ft  -     Deviations/Abnormal Patterns (Gait) antalgic;gait speed decreased  -     Comment, (Gait/Stairs) pt got lightheaded and chair was brought up behind her to return to room.  -               User Key  (r) = Recorded By, (t) = Taken By, (c) = Cosigned By      Initials Name Provider Type    Lindsey Bullard, PT Physical Therapist                   Obj/Interventions       [facility] Name 09/22/23 1655          Range of Motion Comprehensive    Comment, General Range of Motion WFl except L hip limited by pain  -[facility] Name 09/22/23 1655          Strength Comprehensive (MMT)    Comment, General Manual Muscle Testing (MMT) Assessment WFl  -[facility] Name 09/22/23 1655          Motor Skills    Therapeutic Exercise hip  l Iggy protocol x 10 reps  -               User Key  (r) = Recorded By, (t) = Taken By, (c) = Cosigned By      Initials Name Provider Type    Lindsey Bullard, PT Physical Therapist                   Goals/Plan    No documentation.                  Clinical Impression       [facility] Name 09/22/23 1655          Pain    Pretreatment Pain Rating 0/10 - no pain  -     Posttreatment Pain Rating 1/10  -     Pain Location - Side/Orientation Left  -     Pain Location - hip  -     Pain Intervention(s) Cold applied;Rest  -[facility] Name 09/22/23 1655          Plan of Care Review    Outcome Evaluation Pt is s/p L IGGY and is WBAT. Pt presents with pain, limited ROM and antalgic  gait. She ambulated safely with Rwx and was educated on navigating 1 step in house. Pt became lightheaded and was rolled back to the room in a chair.  Pt verbalized undestanding after having previous hip done.  Pt  and family was educated on HEP, use of AD and stairs. Pt demonstrated independence with HEP and was given a written copy. Pt is safe to d/c home when medically stable. PT will sign off.  -       Row Name 09/22/23 1655          Therapy Assessment/Plan (PT)    Patient/Family Therapy Goals Statement (PT) return home to PLOF  -       Row Name 09/22/23 1655          Positioning and Restraints    Pre-Treatment Position in bed  -KH     Post Treatment Position bed  -KH     In Bed fowlers;with family/caregiver;with nsg  -               User Key  (r) = Recorded By, (t) = Taken By, (c) = Cosigned By      Initials Name Provider Type    Lindsey Bullard, PT Physical Therapist                   Outcome Measures       Row Name 09/22/23 1658          How much help from another person do you currently need...    Turning from your back to your side while in flat bed without using bedrails? 4  -KH     Moving from lying on back to sitting on the side of a flat bed without bedrails? 4  -KH     Moving to and from a bed to a chair (including a wheelchair)? 4  -KH     Standing up from a chair using your arms (e.g., wheelchair, bedside chair)? 4  -KH     Climbing 3-5 steps with a railing? 3  -KH     To walk in hospital room? 3  -KH     AM-PAC 6 Clicks Score (PT) 22  -KH     Highest level of mobility 7 --> Walked 25 feet or more  -       Row Name 09/22/23 1658          Functional Assessment    Outcome Measure Options AM-PAC 6 Clicks Basic Mobility (PT)  -               User Key  (r) = Recorded By, (t) = Taken By, (c) = Cosigned By      Initials Name Provider Type    Lindsey Bullard PT Physical Therapist                                 Physical Therapy Education       Title: PT OT SLP Therapies (Not  Started)       Topic: Physical Therapy (Not Started)       Point: Mobility training (Not Started)       Learner Progress:  Not documented in this visit.              Point: Home exercise program (Not Started)       Learner Progress:  Not documented in this visit.              Point: Body mechanics (Not Started)       Learner Progress:  Not documented in this visit.              Point: Precautions (Not Started)       Learner Progress:  Not documented in this visit.                                  PT Recommendation and Plan     Outcome Evaluation: Pt is s/p L CLIFF and is WBAT. Pt presents with pain, limited ROM and antalgic gait. She ambulated safely with Rwx and was educated on navigating 1 step in house. Pt became lightheaded and was rolled back to the room in a chair.  Pt verbalized undestanding after having previous hip done.  Pt  and family was educated on HEP, use of AD and stairs. Pt demonstrated independence with HEP and was given a written copy. Pt is safe to d/c home when medically stable. PT will sign off.     Time Calculation:         PT Charges       Row Name 09/22/23 1658             Time Calculation    Start Time 1412  -KH      Stop Time 1437  -KH      Time Calculation (min) 25 min  -KH      PT Received On 09/22/23  -         Time Calculation- PT    Total Timed Code Minutes- PT 25 minute(s)  -KH         Timed Charges    59982 - PT Therapeutic Exercise Minutes 8  -KH      35514 - Gait Training Minutes  7  -KH         Untimed Charges    PT Eval/Re-eval Minutes 10  -KH         Total Minutes    Timed Charges Total Minutes 15  -KH      Untimed Charges Total Minutes 10  -KH       Total Minutes 25  -KH                User Key  (r) = Recorded By, (t) = Taken By, (c) = Cosigned By      Initials Name Provider Type    Lindsey Bullard PT Physical Therapist                  Therapy Charges for Today       Code Description Service Date Service Provider Modifiers Qty    09188508046  PT THER PROC EA 15  MIN 9/22/2023 Lindsey Grijalva, PT GP 1    [id number] HC PT EVAL MOD COMPLEXITY 1 9/22/2023 Lindsey Grijalva, PT GP 1            PT G-Codes  Outcome Measure Options: AM-PAC 6 Clicks Basic Mobility (PT)  AM-PAC 6 Clicks Score (PT): 22  PT Discharge Summary  Anticipated Discharge Disposition (PT): home with assist, home with home health    Lindsey Grijalva, PT  9/22/2023

## 2023-09-22 NOTE — ANESTHESIA PROCEDURE NOTES
Spinal Block      Patient reassessed immediately prior to procedure    Patient location during procedure: OR  Start Time: 9/22/2023 8:43 AM  Indication:at surgeon's request  Performed By  Anesthesiologist: Charly Ruiz MD CRNA/CAA: Mariano Garnett CRNA  Preanesthetic Checklist  Completed: patient identified, IV checked, site marked, risks and benefits discussed, surgical consent, monitors and equipment checked, pre-op evaluation and timeout performed  Spinal Block Prep:  Patient Position:sitting  Sterile Tech:cap, gloves, sterile barriers and mask  Prep:Chloraprep  Patient Monitoring:continuous pulse oximetry and blood pressure monitoring    Spinal Block Procedure  Approach:midline  Guidance:palpation technique  Location:L4-L5  Needle Type:Quincke  Needle Gauge:25 G  Placement of Spinal needle event:cerebrospinal fluid aspirated  Paresthesia: no  Fluid Appearance:clear  Medications: bupivacaine PF (MARCAINE) 0.75 % injection - Spinal   1.4 mL - 9/22/2023 8:43:00 AM   Post Assessment  Patient Tolerance:patient tolerated the procedure well with no apparent complications  Complications no

## 2023-09-22 NOTE — ANESTHESIA PREPROCEDURE EVALUATION
Anesthesia Evaluation     Patient summary reviewed and Nursing notes reviewed   no history of anesthetic complications:   NPO Solid Status: > 8 hours  NPO Liquid Status: > 2 hours           Airway   Mallampati: II  TM distance: >3 FB  Neck ROM: full  Dental      Pulmonary    (+) asthma,  Cardiovascular     (+) hypertension      Neuro/Psych  GI/Hepatic/Renal/Endo    (+) thyroid problem hypothyroidism    Musculoskeletal     Abdominal    Substance History      OB/GYN          Other                        Anesthesia Plan    ASA 2     spinal     intravenous induction     Anesthetic plan, risks, benefits, and alternatives have been provided, discussed and informed consent has been obtained with: patient.      CODE STATUS:

## 2023-09-22 NOTE — OP NOTE
Operative  Note    Patient: Conchita Huff  YOB: 1940    Medical Record Number: [id number]    Attending Physician: Onel Irvin,*    Date of Service: 9/22/2023     Pre-op Diagnosis:   Primary osteoarthritis of left hip [M16.12]    Post-Op Diagnosis Codes:     * Primary osteoarthritis of left hip [M16.12]    PROCEDURE PERFORMED:  Procedure(s):  TOTAL HIP ARTHROPLASTY ANTERIOR WITH HANA TABLE by utilizing a Direct anterior approach with      Depuy   [place]  Porocoat Acetabular  Shell Sector with  holes size 48 mm outer diameter   Neutral ALTRX  Polyethylene acetabular  liner- 32 mm internal diameter,   Actis Duo fix Cementless  Standard Collar femoral stem size # 4    Delta ceramic femoral head- 32 mm outer diameter, + 5 neck length by utilizing a [place] table and image intensifier.     Implant Name Type Inv. Item Serial No.  Lot No. LRB No. Used Action   DEV CONTRL TISS STRATAFIX SPIRAL MNCRYL UD 3/0 PLS 30CM - [id number] Implant DEV CONTRL TISS STRATAFIX SPIRAL MNCRYL UD 3/0 PLS 30CM  [employer]  DIV OF J AND J THBCTA Left 1 Implanted   DEV CONTRL TISS STRATAFIX SPIRAL PDS PLS CT1 2-0 45CM - [id number] Implant DEV CONTRL TISS STRATAFIX SPIRAL PDS PLS CT1 2-0 45CM  [employer]  DIV OF J AND J [id number] Left 1 Implanted   SUT FW #2 W/TPR NDL 1/2 CIR 38IN 97CM 26.5MM JOSE ELIAS - [id number] Implant SUT FW #2 W/TPR NDL 1/2 CIR 38IN 97CM 26.5MM JOSE ELIAS  ARTHREX [id number] Left 2 Implanted   CUP ACET PINN SECTOR 48MM - [id number] Implant CUP ACET PINN SECTOR 48MM  DEPUY [id number] Left 1 Implanted   LINER ACET PINN ALTRX NTRL 24K47YD - [id number] Implant LINER ACET PINN ALTRX NTRL [id number]  DEPUY [id number] Left 1 Implanted   HD FEM BIOLOXDELTA/ART CERAM 32MM PLS5 - [id number] Implant HD FEM BIOLOXDELTA/ART CERAM 32MM PLS5  DEPUY [id number] Left 1 Implanted   STEM FEM/HIP ACTIS COLR CMTLS STD OFFST 12/14TPR SZ4 - [id number] Implant STEM FEM/HIP ACTIS COLR CMTLS STD OFFST 12/14TPR SZ4   [employer] [id number] Left 1 Implanted   HEMOST ABS SURGIFOAM  8X12 10MM - [id number] Implant HEMOST ABS SURGIFOAM  8X12 10MM  [employer]  DIV OF J AND J [id number] Left 1 Implanted     SURGEON: Onel Irvin MD     ASSISTANT: Jeo Parnell MD, Fellow    ANNA Farley      The services of a first assist were necessary for performing the procedure safely and expeditiously.  The first assist was present for the entire duration of the case and helped with positioning, retraction and closure of the incision.     ANESTHESIA:  Spinal  Anesthesiologist: Charly Ruiz MD  CRNA: Mariano Garnett CRNA     Estimated Blood Loss: 200 mL    Specimens: * No orders in the log *    COMPLICATIONS: Nil.     DRAINS: * No LDAs found *    INDICATIONS: The patient is a 83 y.o. female who presented to  my office with complaints of progressively worsening pain in the hip. The patient has reached a point of disability secondary to the severe pain and immobility. The patient had difficulty with activities of daily living.  The patient has failed nonoperative management.      The medical history was reviewed. The patient was indicated for a  total hip arthroplasty. Likely risks and benefits of the procedure including, but not limited to infection, DVT, pulmonary embolism, leg length discrepancy, recurrent dislocation, possibility of injury to nerves or vessels, and periprosthetic fractures, Possibility of medical complications including but not limited to stroke, heart attack have been discussed in detail. Despite the risks involved, the patient elected to proceed and informed consent was obtained. The patient was seen in the preoperative holding area, and the  operative site was marked.     She is known to me from her right total hip replacement.  She is doing well with the right hip.    DESCRIPTION OF PROCEDURE: The patient has been transferred to [facility] Operating Room.   Preoperative antibiotics in  the form of  Kefzol was given intravenously prior to the incision.   After achieving adequate  anesthesia, the patient was transferred onto the [place] table. All bony prominences were padded adequately.   The operative hip was prepped and draped in the usual sterile fashion.   Surgical timeout was done. Correct patient, surgical side and site were identified.  Tranexamic acid was given intravenously at the time of skin incision.    A skin incision was made overlying the anterolateral aspect of the  hip for about 10 cm from just below and lateral to the anterior superior iliac spine. Skin and subcutaneous tissue were incised and the deep fascia was incised in line with the skin incision overlying the tensor fascia kim muscle. The tensor muscle was retracted laterally and interval between the sartorius and the rectus femoris medially and the tensor fascia kim muscle laterally was developed.  She had severely atrophic tensor muscle and rectus muscles.  The lateral circumflex femoral vessels were identified. They were clamped, cut, and ligated. Unnamed deep fascia was incised. Capsule of the hip joint was identified. Retractors were placed extracapsularly.     The capsule was incised in a L-shaped manner and the anterior capsule was tagged with FiberWire.  Followed by this, the retractors were placed intracapsularly.     There was a large effusion and a thickened capsule. Appropriate capsular releases were done along the inferior and  medial neck and along the saddle of the femoral neck.  The femoral neck was identified. The femoral neck osteotomy was done according to the preoperative templating , utilizing an oscillating saw. Femoral head was extracted using a corkscrew without any difficulty. The femoral head revealed presence of extensive loss of articular cartilage in the superior weight bearing portion along with femoral head osteophytes.      The acetabular cavity was inspected and exposed appropriately by  placing retractors taking care to protect the anterior neurovascular structures. . The labrum was excised, pulvinar was excised from the cotyloid fossa. Acetabular cavity was progressively reamed, maintaining the correct inclination and version under image intensifier control. Adequate medialization was obtained. Adequate fit was found to be obtained at reamer size 48.  The  [place]  Porocoat  Acetabular Shell Sector with  holes 48 mm was seated into position. It was found to be seated satisfactorily with adequate inclination and anteversion. There was good stability. Followed by this, a polyethylene liner was seated into position, checked for stability. This was a 32 mm internal diameter,  highly cross linked neutral 0° lip liner.     The periarticular tissue was infiltrated with local anaesthetic injection which consisted of Naropin, clonidine and ketorolac mixture.     Attention was then directed to the proximal femur. The proximal femur was delivered by utilizing a trochanteric hook placed just distal to the vastus tubercle and proximal to the gluteus neto tendon. The leg was then externally rotated with the gross traction released and  hyperextension, adduction was done using the [employer] table spar. The mechanical lift on the table was utilized to support the femur.  Appropriate capsular releases were made to expose the medial slope of the greater trochanter and the proximal femur was delivered. The femoral canal was entered by removing the lateral femoral neck with a box chisel followed by serial broaching. Adequate fit was found to be obtained with a size 4 broach. A trial reduction was performed. Leg lengths and offset were found to be satisfactory under image intensifier on comparison with the opposite hip under image intensifier. Reduction was found to be satisfactory and there was good stability with range of motion of the hip in internal and external rotation. Having been satisfied with the trials, the  hip joint was dislocated.     The femoral  trial broach was removed and  Actis Duo fix Cementless  Standard Collar femoral stem size #4 was seated into position. This was found to be satisfactorily seated with good stability.      The delta ceramic femoral head 32 mm +5 mm neck length was seated onto the trunnion and impacted. Followed by this, the hip joint was reduced. Reduction was found to be satisfactory and image confirmed leg length, offset , implant position and stem fill of the femoral canal.  There were no iatrogenic fractures. Hip joint was thoroughly irrigated with saline. Soft tissue hemostasis was secured. The sponge count and needle count was correct. The FiberWire sutures was tagged to the anterior capsular flaps and they were tied to each other. The incision was closed in layers with vicryl and monocryl sutures and the patient was transferred to the recovery room in a stable condition.     The patient tolerated the procedure well. There were no complications. The patient had adequate distal pulses and good capillary refill.     The patient will be mobilized with physical therapy.   Antibiotic prophylaxis  will be given postoperatively.  She is being discharged home today    The  patient will be started on DVT prophylaxis with  Aspirin 81 mg twice daily.    I discussed the satisfactory performance of the procedure with the patient's family and discussed with them the postoperative management.    CPT CODE : 03066    Onel Irvin MD     Date: 9/22/2023  Time: 10:11 EDT    cc: Ángel Sawyer DO; MD Albaro Rajput Madhusudhan R,*

## 2023-09-22 NOTE — DISCHARGE SUMMARY
Orthopedic Discharge Summary      Patient: Conchita Huff    YOB: 1940    Medical Record Number: [id number]    Attending Physician: Onel Irvin,*    Consulting Physician(s):   Consulting Physician(s)               None              Date of Admission: 9/22/2023  5:33 AM  Date of Discharge:     Admitting Diagnosis: Primary osteoarthritis of left hip [M16.12]    Procedure(s):  TOTAL HIP ARTHROPLASTY ANTERIOR WITH HANA TABLE      Status post total hip replacement, left       No Known Allergies       Discharge Medications        New Medications        Instructions Start Date   aspirin 81 MG EC tablet   81 mg, Oral, 2 Times Daily      docusate sodium 100 MG capsule  Commonly known as: COLACE   100 mg, Oral, 2 Times Daily      traMADol 50 MG tablet  Commonly known as: ULTRAM   50 mg, Oral, Every 8 Hours PRN             Changes to Medications        Instructions Start Date   ondansetron 4 MG tablet  Commonly known as: Zofran  What changed: Another medication with the same name was added. Make sure you understand how and when to take each.   4 mg, Oral, Every 8 Hours PRN      ondansetron 4 MG tablet  Commonly known as: Zofran  What changed: You were already taking a medication with the same name, and this prescription was added. Make sure you understand how and when to take each.   4 mg, Oral, Every 8 Hours PRN             Continue These Medications        Instructions Start Date   acetaminophen 500 MG tablet  Commonly known as: TYLENOL   1,000 mg, Oral, Every 6 Hours PRN      calcium carbonate 600 MG tablet  Commonly known as: OS-NELL   600 mg, Oral, Daily      cholecalciferol 25 MCG (1000 UT) tablet  Commonly known as: VITAMIN D3   1,000 Units, Oral, Daily      dilTIAZem  MG 24 hr capsule  Commonly known as: CARDIZEM CD   120 mg, Oral, Daily      famotidine 20 MG tablet  Commonly known as: PEPCID   20 mg, Oral, 2 Times Daily      levothyroxine 25 MCG tablet  Commonly known as: SYNTHROID,  "LEVOTHROID   25 mcg, Oral, Daily      lisinopril 5 MG tablet  Commonly known as: PRINIVIL,ZESTRIL   5 mg, Oral, Daily      metoprolol succinate XL 25 MG 24 hr tablet  Commonly known as: TOPROL-XL   12.5 mg, Oral, Daily      miSOPROStol 25 MCG split tablet  Commonly known as: CYTOTEC   12.5 mcg, Cervical, Once      montelukast 10 MG tablet  Commonly known as: SINGULAIR   10 mg, Oral, Nightly      rosuvastatin 5 MG tablet  Commonly known as: CRESTOR   1 tablet, Oral, Daily                    Past Medical History:   Diagnosis Date    Arthritis     Asthma     Disease of thyroid gland     Hypertension     Right hip pain         Past Surgical History:   Procedure Laterality Date    COLONOSCOPY      EYE SURGERY      HYSTERECTOMY      TOTAL HIP ARTHROPLASTY Right 10/28/2022    Procedure: TOTAL HIP ARTHROPLASTY ANTERIOR WITH HANA TABLE;  Surgeon: Onel Irvin MD;  [place] [facility] [place] [facility] [place]  Service: Orthopedics;  Laterality: Right;        Social History     Occupational History    Not on file   Tobacco Use    Smoking status: Never    Smokeless tobacco: Not on file   Substance and Sexual Activity    Alcohol use: Never    Drug use: Never    Sexual activity: Defer      Social History     Social History Narrative    Not on file        Family History   Problem Relation Age of Onset    Malig Hyperthermia Neg Hx        Physical Exam: 83 y.o. female  General Appearance:    Alert, cooperative, in no acute distress                      Vitals:    09/21/23 0942 09/22/23 0628   BP:  173/73   BP Location:  Right arm   Patient Position:  Sitting   Pulse:  69   Resp:  16   Temp:  97.4 °F (36.3 °C)   TempSrc:  Oral   SpO2:  98%   Weight: 49.9 kg (110 lb)    Height: 160 cm (63\")         Hospital Course:  83 y.o. female admitted to [facility] to services of Onel Irvin * with Primary osteoarthritis of left hip [M16.12] on 9/22/2023 and underwent a LEFT total hip arthroplasty Per Onel Irvin MD. " Antibiotic  Kefzol  every 8 hours and VTE prophylaxis  Aspirin  orally  were per SCIP protocols. Post-operatively the patient transferred to the post-operative floor where the patient underwent mobilization therapy that included active ROM exercises. Opioids were titrated to achieve appropriate pain management to allow for participation in mobilization exercises. Vital signs are now stable. The incision is intact without signs or symptoms of infection. Operative extremity neurovascular status remains intact.     Appropriate education re: incision care, activity levels, medications, hip dislocation precautions, and follow up visits was completed and all questions were answered.     The patient is now deemed stable for discharge.    DISCHARGE DISPOSITION AND PLAN:  The  Patient is being discharged home with home health for PT   2-3 X per week for 2-3 weeks and nursing care as needed.     DIAGNOSTIC TESTS:     No visits with results within 2 Day(s) from this visit.   Latest known visit with results is:   No results found for any previous visit.     No results found for: URICACID  No results found for: CRYSTAL  Microbiology Results (last 10 days)       ** No results found for the last 240 hours. **          No radiology results for the last 7 days      Follow-up Appointments  No future appointments.      Discharge and Follow up Instructions:     I. ACTIVITIES:  1. Exercises:  Complete exercise program as taught by the hospital physical therapist 2 times per day  Exercise program will be advanced by the physical therapist  During the day be up ambulating every 2 hours (while awake) for short distances  Complete the ankle pump exercises at least 10 times per hour (while awake)  Elevate legs when in bed and for at least 30 minutes during the day.Use cold packs 20-30 minutes approximately 5 times per day. This should be done before and after completing your exercises and at any time you are experiencing pain/ stiffness in  your operative extremity.      2. Activities of Daily Living:  No tub baths, hot tubs, or swimming pools for 4 weeks  May shower and let water run over the incision on post-operative day #5 if no drainage. Do not scrub or rub the incision. Simply let the water run over the incision and pat dry.    II. Restrictions  Continue  Anterior hip precautions as taught at the hospital  Your surgeon will discuss with you when you will be able to drive again. Usual guidelines are you are to be off pain medications prior to driving.  Weight bearing is as tolerated  First week stay inside on even terrain. May go up and down stairs one stair at a time utilizing the hand rail once cleared by physical therapy to do so.  After one week, you may venture outside (if cleared to do so by physical therapist).    III. Precautions:  Everyone that comes near you should wash their hands  No elective dental, genital-urinary, or colon procedures or surgical procedures for 12 weeks after surgery unless absolutely necessary.   If dental work or surgical procedure is deemed absolutely necessary, you will need to contact your surgeon as you will need to take antibiotics 1 hour prior to any dental work (including teeth cleanings).  Please discuss with your surgeon prophylactic antibiotics as the length of time this intervention will be necessary for you varies with each patient’s health history and situation.  Avoid sick people. If you must be around someone who is ill, they should wear a mask.  Avoid visits to the Emergency Room or Urgent Care. If you feel you need to go to the Emergency Room, please notify your Surgeon's office.  Stockings are to be worn for one week after surgery and are to be placed on in the morning and removed at night. Observe your skin when stocking is removed for any problems. Monitor the stockings to ensure that any swelling is not causing the stockings to become too tight. In this case, remove stockings  immediately.      IV. INCISION CARE:  Wash your hands prior to dressing changes  Change the dressing as needed to keep incision clean and dry. Utilize dry gauze and paper tape. Avoid touching the side of the gauze that goes against the incision with your hands.  No creams or ointments to the incision  May remove dressing once the incision is free of drainage  Do not touch or pick at the incision  Check incision every day and notify surgeon immediately if any of the following signs or symptoms are noted:  Increase in redness  Increase in swelling around the incision and of the entire extremity  Increase in pain  Drainage oozing from the incision  Pulling apart of the edges of the incision  Increase in overall body temperature (greater than 100.5 degrees)     You have absorbable sutures with steristrips, please do not remove the  steristrips for 14 days, you can shower on them 6 days after surgery.       V. Medications:   1. Anticoagulants: You will be discharged on an anticoagulant. This is a prophylactic medication that helps prevent blood clots during your post-operative period.  You will be on Aspirin  81 mg twice daily for 30 days. If you were on Aspirin 81 mg prior to surgery you can go back to home dose once the 30 days are completed.     While taking the anticoagulant, you should avoid taking any additional aspirin, ibuprofen (Advil or Motrin), Aleve (Naprosyn) or other non-steroidal anti-inflammatory medications.   Notify surgeon immediately if any liya bleeding is noted in the urine, stool, emesis, or from the nose or the incision. Blood in the stool will often appear as black rather than red. Blood in urine may appear as pink. Blood in emesis may appear as brown/black like coffee grounds.  You will need to apply pressure for longer periods of time to any cuts or abrasions to stop bleeding  Avoid alcohol while taking anticoagulants    2. Stool Softeners: You will be at greater risk of constipation after  surgery due to being less mobile and the pain medications.   Take stool softeners as instructed by your surgeon while on pain medications. Over the counter Colace 100 mg 1-2 capsules twice daily.   If stools become too loose or too frequent, please decreases the dosage or stop the stool softener.  If constipation occurs despite use of stool softeners, you are to continue the stool softeners and add a laxative (Milk of Magnesia 1 ounce daily as needed).  Dulcolax oral tabs or suppository, or a fleets enema can also be utilized for constipation and can be obtained over the counter.   If above interventions are unsuccessful in inducing bowel movements, please contact your surgeon's office / family physician's office.  Drink plenty of fluids, and eat fruits and vegetables during your recovery time    3. Pain Medications utilized after surgery are narcotics and the law requires that the following information be given to all patients that are prescribed narcotics:  CLASSIFICATION: Pain medications are called Opioids and are narcotics  LEGALITIES: It is illegal to share narcotics with others and to drive within 24 hours of taking narcotics  POTENTIAL SIDE EFFECTS: Potential side effects of opioids include: nausea, vomiting, itching, dizziness, drowsiness, dry mouth, constipation, and difficulty urinating.  POTENTIAL ADVERSE EFFECTS:   Opioid tolerance can develop with use of pain medications and this simply means that it requires more and more of the medication to control pain; however, this is seen more in patients that use opioids for longer periods of time.  Opioid dependence can develop with use of Opioids and this simply means that to stop the medication can cause withdrawal symptoms; however, this is seen with patients that use Opioids for longer periods of time.  Opioid addiction can develop with use of Opioids and the incidence of this is very unlikely in patients who take the medications as ordered and stop the  medications as instructed.  Opioid overdose can be dangerous, but is unlikely when the medication is taken as ordered and stopped when ordered. It is important not to mix opioids with alcohol or with and type of sedative such as Benadryl as this can lead to over sedation and respiratory difficulty.  DOSAGE:   Pain medications will need to be taken consistently for the first week to decrease pain and promote adequate pain relief and participation in physical therapy.  After the initial surgical pain begins to resolve, you may begin to decrease the pain medication. By the end of 6 weeks, you should be off of pain medications.  Refills will not be given by the office during evening hours, on weekends, or after 6 weeks post-op.  To seek refills on pain medications during the initial 6 week post-operative period, you must call the office 48 hours in advance to request the refill. The office will then notify you when to  the prescription. DO NOT wait until you are out of the medication to request a refill.    V. FOLLOW-UP VISITS:  You will need to follow up in the office with your surgeon on Oct 18 ,2023. Please call this number [phone]  to schedule this appointment.  If you have any concerns or suspected complications prior to your follow up visit, please call your surgeons office. Do not wait until your appointment time if you suspect complications. These will need to be addressed in the office promptly.    Date: 9/22/2023    Onel Irvin MD    CC: Ángel Sawyer, DO; MD Albaro Rajput Madhusudhan R,*

## 2023-09-25 ENCOUNTER — TELEPHONE (OUTPATIENT)
Dept: ORTHOPEDIC SURGERY | Facility: HOSPITAL | Age: 83
End: 2023-09-25

## 2023-09-25 NOTE — CASE MANAGEMENT/SOCIAL WORK
Continued Stay Note  [facility]     Patient Name: Conchita Huff  MRN: [id number]  Today's Date: 9/25/2023    Admit Date: 9/22/2023    Plan: Home with [facility]   Discharge Plan       Plan       Row Name 09/25/23 1651    Plan Home with [facility]    Plan Comments Patient went home and decided she wanted HH.  I was able to get [facility].                           Discharge Codes    No documentation.                 Expected Discharge Date and Time       Expected Discharge Date Expected Discharge Time    Sep 22, 2023               Shannon Epley, RN

## 2023-09-25 NOTE — DISCHARGE PLACEMENT REQUEST
"Horace Brewer (83 y.o. Female)    YOB: 1940  Social Security Number:   Address: [address] [place] [address]  Home Phone: [phone]  MRN: [id number]  [employer]: [employer]  Marital Status:         Admission Date: 9/22/23  Admission Type: Elective  Admitting Provider: Onel Irvin MD  Attending Provider:   Department, Room/Bed: [facility] OR, [facility] OR  Discharge Date: 9/22/2023  Discharge Disposition: Home-Health Care Svc  Discharge Destination:               Attending Provider: (none)   Allergies: No Known Allergies    Isolation: None   Infection: None   Code Status: Not on file    Ht: 160 cm (63\")   Wt: 49.9 kg (110 lb)    Admission Cmt: None   Principal Problem: Primary osteoarthritis of left hip [M16.12]                   Active Insurance as of 9/22/2023       Primary Coverage       Payor Plan Insurance Group Employer/Plan Group    MEDICARE MEDICARE A & B        Payor Plan Address Payor Plan Phone Number Payor Plan Fax Number Effective Dates    [address] [phone]  3/1/2004 - None Entered    [facility] [id number]         Subscriber Name Subscriber Birth Date Member ID       HORACE BREWER 1940 [id number]               Secondary Coverage       Payor Plan Insurance Group Employer/Plan Group    [facility] SUPP [id number]       Payor Plan Address Payor Plan Phone Number Payor Plan Fax Number Effective Dates    [address]   12/1/2016 - None Entered    [facility] [id number]         Subscriber Name Subscriber Birth Date Member ID       HORACE BREWER 1940 [id number]                     Emergency Contacts        (Rel.) Home Phone Work Phone Mobile Phone    LAISHA BREWER (Son) [phone] None None                "

## 2023-09-25 NOTE — TELEPHONE ENCOUNTER
Post op day 3  Discharge Instructions:  Ask patient about his or her discharge instructions  ?  Patient confirmed understanding   []  Further instruction needed   What, if any, recommendations, teaching, or interventions did you provide? Click or tap here to enter text.  Health status:  Pain controlled Yes   Does have increased pain but the medication does help some   Recommended interventions:  Yes  incision/dressing status   ?  Clean without redness, drainage, odor  []  Redness    []  Drainage - color Click or tap here to enter text.  []  Odor  ALISON - Green light blinking Choose an item.  Difficulties urination No  Last BM 9/22/2023 (if no BM by day 3-recommend OTC suppository or fleets enema)  No issues at this time   Medications:  ?Medications reviewed with patient/family/caregiver  Patient taking medications as prescribed?   Yes  If not taking medications as prescribed, note specific medicine(s) and reason for each:  Click or tap here to enter text.  Hospital Follow Up Plan:  Follow up Appointment with Orthopedic surgeon:  ?Has f/u appointment                []Scheduled f/u appointment  Home Care ordered at discharge?    No        Home Care started, or contact made?    No   If no, action taken: wanted OP PT   DME obtained/used in home?         Yes   Using IS  Choose an item.   Other information: Ms. Huff said she is doing ok. She isn't doing as well this time as she did with the other one. She is still having some dizziness and nausea feeling like she is going to pass out. She was supposed to start with OP PT today, but she just feels she won't be able to do it. She is doing her exercises and trying to walk some, but she isn't doing as well. She is drinking plenty of fluids including electrolytes, as well as switching positions slowly. She is eating well. She said her daughter called to see if she would be able to do HH PT just still she is feeling better. I have reached out to [facility] to see if there is anything  we can do on our end. She is taking the pain medication and it seems to help some though she is having more pain this time as well. Dressing looks good. No issues. Ms. Huff doesn't have any other questions for me at this time. She has my contact information should she need anything.

## 2023-09-26 NOTE — TELEPHONE ENCOUNTER
Spoke with [facility] yesterday about getting HH PT set up for Ms. Huff as she was having such difficulty with her rehab this time. She was able to get [facility] set up for her. I have called Ms. Huff and let her know that [facility] should be reaching out to her to set up a time. She knows to give me a call if she doesn't hear from them by this afternoon so we can see what's going on. She voiced understanding. Ms. Huff doesn't have any other questions for me at at this time. She has my contact information should she need anything.

## 2023-09-27 NOTE — NURSING NOTE
While assessing patient after arriving from PACu this RN noticed that iv was infiltrated. So iv  was removed and warm compress applied. Before patient going home we reassessed patient's arm and noticed swelling went down. Pt did not have any complaints at this time.

## 2023-10-02 ENCOUNTER — TELEPHONE (OUTPATIENT)
Dept: ORTHOPEDIC SURGERY | Facility: HOSPITAL | Age: 83
End: 2023-10-02
Payer: MEDICARE

## 2023-10-02 NOTE — TELEPHONE ENCOUNTER
Called and spoke with Ms. Huff at this time to see how she is doing as she is 2 weeks SP [facility]. She said she is doing so much better now. She is getting up and walking more. She was able to get HH PT out to see her. She said her BP runs high and low and that was causing a lot of her issues. She is drinking fluids especially electrolytes and that seems to help. Pain is pretty well controlled. This time has been so much harder than the last one, but she is getting better. Ms. Huff doesn't have any questions for me at this time. She has my contact information should she need anything.

## (undated) DEVICE — DECANTER BAG 9": Brand: MEDLINE INDUSTRIES, INC.

## (undated) DEVICE — TBG PENCL TELESCP MEGADYNE SMOKE EVAC 10FT

## (undated) DEVICE — PK ANT HIP 40

## (undated) DEVICE — SHEET, DRAPE, SPLIT, STERILE: Brand: MEDLINE

## (undated) DEVICE — GLV SURG PREMIERPRO ORTHO LTX PF SZ8 BRN

## (undated) DEVICE — TINCTURE OF BENZOIN SKIN PREP SPRAY IS A SKIN PREP SPRAY THAT ENHANCES THE ADHESION OF TAPE/APPLIANCE WHILE PROTECTING SENSITIVE SKIN FROM ADHESIVES AND BODY FLUIDS.: Brand: TINCTURE OF BENZOIN SPRAY 4OZ US

## (undated) DEVICE — TRAP FLD MINIVAC MEGADYNE 100ML

## (undated) DEVICE — SOL ISO/ALC RUB 70PCT 4OZ

## (undated) DEVICE — PATIENT RETURN ELECTRODE, SINGLE-USE, CONTACT QUALITY MONITORING, ADULT, WITH 9FT CORD, FOR PATIENTS WEIGING OVER 33LBS. (15KG): Brand: MEGADYNE

## (undated) DEVICE — 1010 S-DRAPE TOWEL DRAPE 10/BX: Brand: STERI-DRAPE™

## (undated) DEVICE — GLV SURG BIOGEL LTX PF 8

## (undated) DEVICE — SYR CONTRL PRESS/LO FIX/M/LL W/THMB/RNG 10ML

## (undated) DEVICE — SUT VIC 3/0 CTI 36IN J944H

## (undated) DEVICE — SYS CLS SKIN PREMIERPRO EXOFINFUSION 22CM

## (undated) DEVICE — SKIN PREP TRAY W/CHG: Brand: MEDLINE INDUSTRIES, INC.

## (undated) DEVICE — MAT FLR ABSORBENT LG 4FT 10 2.5FT

## (undated) DEVICE — SUT MNCRYL 3/0 PS2 18IN MCP497G

## (undated) DEVICE — APPL CHLORAPREP HI/LITE 26ML ORNG

## (undated) DEVICE — GLV SURG SIGNATURE ESSENTIAL PF LTX SZ8

## (undated) DEVICE — DRAPE,REIN 53X77,STERILE: Brand: MEDLINE

## (undated) DEVICE — OPTIFOAM GENTLE SA, POSTOP, 4X8: Brand: MEDLINE

## (undated) DEVICE — SOL ISO/ALC 70PCT 4OZ

## (undated) DEVICE — PATIENT RETURN ELECTRODE, SINGLE USE, CONTACT QUALITY MONITORING, ADULT WITH 9 FT (2.7 M) CORD. FOR PATIENTS WEIGHING OVER 33LBS. (15KG): Brand: MEGADYNE